# Patient Record
Sex: FEMALE | Race: WHITE | NOT HISPANIC OR LATINO | URBAN - METROPOLITAN AREA
[De-identification: names, ages, dates, MRNs, and addresses within clinical notes are randomized per-mention and may not be internally consistent; named-entity substitution may affect disease eponyms.]

---

## 2018-05-14 ENCOUNTER — OFFICE VISIT (OUTPATIENT)
Dept: DERMATOLOGY | Facility: CLINIC | Age: 28
End: 2018-05-14
Payer: COMMERCIAL

## 2018-05-14 DIAGNOSIS — L72.9 SCALP CYST: ICD-10-CM

## 2018-05-14 DIAGNOSIS — D48.5 NEOPLASM OF UNCERTAIN BEHAVIOR OF SKIN: ICD-10-CM

## 2018-05-14 DIAGNOSIS — Z12.83 SKIN CANCER SCREENING: Primary | ICD-10-CM

## 2018-05-14 DIAGNOSIS — D22.9 MULTIPLE PIGMENTED NEVI: ICD-10-CM

## 2018-05-14 ASSESSMENT — PAIN SCALES - GENERAL
PAINLEVEL: NO PAIN (0)
PAINLEVEL: NO PAIN (0)

## 2018-05-14 NOTE — MR AVS SNAPSHOT
After Visit Summary   5/14/2018    Valerie Mansfield    MRN: [id number]           Patient Information     Date Of Birth          1990        Visit Information        Provider Department      5/14/2018 1:30 PM Radha Marshall PA-C [facility] Dermatology        Today's Diagnoses     Skin cancer screening    -  1    Multiple pigmented nevi        Neoplasm of uncertain behavior of skin          Care Instructions    Wound Care After a Biopsy    What is a skin biopsy?  A skin biopsy allows the doctor to examine a very small piece of tissue under the microscope to determine the diagnosis and the best treatment for the skin condition. A local anesthetic (numbing medicine)  is injected with a very small needle into the skin area to be tested. A small piece of skin is taken from the area. Sometimes a suture (stitch) is used.     What are the risks of a skin biopsy?  I will experience scar, bleeding, swelling, pain, crusting and redness. I may experience incomplete removal or recurrence. Risks of this procedure are excessive bleeding, bruising, infection, nerve damage, numbness, thick (hypertrophic or keloidal) scar and non-diagnostic biopsy.    How should I care for my wound for the first 24 hours?    Keep the wound dry and covered for 24 hours    If it bleeds, hold direct pressure on the area for 15 minutes. If bleeding does not stop then go to the emergency room    Avoid strenuous exercise the first 1-2 days or as your doctor instructs you    How should I care for the wound after 24 hours?    After 24 hours, remove the bandage    You may bathe or shower as normal    If you had a scalp biopsy, you can shampoo as usual and can use shower water to clean the biopsy site daily    Clean the wound twice a day with gentle soap and water    Do not scrub, be gentle    Apply white petroleum/Vaseline after cleaning the wound with a cotton swab or a clean finger, and keep the site covered with a Bandaid /bandage.  Bandages are not necessary with a scalp biopsy    If you are unable to cover the site with a Bandaid /bandage, re-apply ointment 2-3 times a day to keep the site moist. Moisture will help with healing    Avoid strenuous activity for first 1-2 days    Avoid lakes, rivers, pools, and oceans until the stitches are removed or the site is healed    How do I clean my wound?    Wash hands thoroughly with soap or use hand  before all wound care    Clean the wound with gentle soap and water    Apply white petroleum/Vaseline  to wound after it is clean    Replace the Bandaid /bandage to keep the wound covered for the first few days or as instructed by your doctor    If you had a scalp biopsy, warm shower water to the area on a daily basis should suffice    What should I use to clean my wound?     Cotton-tipped applicators (Qtips )    White petroleum jelly (Vaseline ). Use a clean new container and use Q-tips to apply.    Bandaids   as needed    Gentle soap     How should I care for my wound long term?    Do not get your wound dirty    Keep up with wound care for one week or until the area is healed.    A small scab will form and fall off by itself when the area is completely healed. The area will be red and will become pink in color as it heals. Sun protection is very important for how your scar will turn out. Sunscreen with an SPF 30 or greater is recommended once the area is healed.    If you have stitches, stitches need to be removed in 14 days. You may return to our clinic for this or you may have it done locally at your doctor s office.    You should have some soreness but it should be mild and slowly go away over several days. Talk to your doctor about using tylenol for pain,    When should I call my doctor?  If you have increased:     Pain or swelling    Pus or drainage (clear or slightly yellow drainage is ok)    Temperature over 100F    Spreading redness or warmth around wound    When will I hear about my  results?  The biopsy results can take 2-3 weeks to come back. The clinic will call you with the results, send you a [employer] message, or have you schedule a follow-up clinic or phone time to discuss the results. Contact our clinics if you do not hear from us in 3 weeks.     Who should I call with questions?    [facility]: [phone]     [facility]: [phone]    For urgent needs outside of business hours call the [facility] at [phone] and ask for the dermatology resident on call              Follow-ups after your visit        Who to contact     Please call your clinic at [phone] to:    Ask questions about your health    Make or cancel appointments    Discuss your medicines    Learn about your test results    Speak to your doctor            Additional Information About Your Visit        [employer] Information     [employer] gives you secure access to your electronic health record. If you see a primary care provider, you can also send messages to your care team and make appointments. If you have questions, please call your primary care clinic.  If you do not have a primary care provider, please call [phone] and they will assist you.      [employer] is an electronic gateway that provides easy, online access to your medical records. With [employer], you can request a clinic appointment, read your test results, renew a prescription or communicate with your care team.     To access your existing account, please contact your [facility] or call [phone] for assistance.        Care EveryWhere ID     This is your Care EveryWhere ID. This could be used by other organizations to access your [place] medical records  [id number]         Blood Pressure from Last 3 Encounters:   10/19/15 110/71   01/13/14 112/77    Weight from Last 3 Encounters:   10/19/15 54.7 kg (120 lb 8 oz)   01/13/14 64 kg (141 lb 3.2 oz)               We Performed the Following     BIOPSY SKIN/SUBQ/MUC MEM, SINGLE LESION     Dermatological path order and indications        Primary Care Provider Office Phone # Fax #    Chris Greene [phone]       [facility] [address] [facility] [address]        Equal Access to Services     AMANDA HARTMANN : Hadii aad ku hadasho Soomaali, waaxda luqadaha, qaybta kaalmada adeegyada, waxay ayanin haykarenn juve corderoanniejosh quiroz . So [facility] [phone].    ATENCIÓN: Si habla español, tiene a marinelli disposición servicios gratuitos de asistencia lingüística. Rossana al [phone].    We comply with applicable federal civil rights laws and [place] laws. We do not discriminate on the basis of race, color, national origin, age, disability, sex, sexual orientation, or gender identity.            Thank you!     Thank you for choosing [facility] DERMATOLOGY  for your care. Our goal is always to provide you with excellent care. Hearing back from our patients is one way we can continue to improve our services. Please take a few minutes to complete the written survey that you may receive in the mail after your visit with us. Thank you!             Your Updated Medication List - Protect others around you: Learn how to safely use, store and throw away your medicines at www.disposemymeds.org.          This list is accurate as of 5/14/18  2:27 PM.  Always use your most recent med list.                   Brand Name Dispense Instructions for use Diagnosis    misoprostol 200 MCG tablet    CYTOTEC    2 tablet    Take 2 hours before procedure. Place 1 tab between cheek and gum on the right, and 2nd tab on the left. Dissolve for 30 min, then swallow.    General counseling and advice on female contraception

## 2018-05-14 NOTE — NURSING NOTE
Dermatology Rooming Note    Valerie Mansfield's goals for this visit include:   Chief Complaint   Patient presents with     Derm Problem     Valerie is here for a skin check, states she has a concerning mole on her back, state it's been painful the past 3 days.         Cornelia Leon LPN

## 2018-05-14 NOTE — LETTER
5/14/2018       RE: Valerie Mansfield  [address]  [address]  [place] [address]     Dear Colleague,    Thank you for referring your patient, Valerie Mansfield, to the [facility] DERMATOLOGY at [facility]. Please see a copy of my visit note below.    [facility] Dermatology Note      Dermatology Problem List:  1. New patient     Encounter Date: May 14, 2018    CC:  Chief Complaint   Patient presents with     Derm Problem     Valerie is here for a skin check, states she has a concerning mole on her back, state it's been painful the past 3 days.       History of Present Illness:  Ms. Valerie Mansfield is a 28 year old female who presents today for a skin check. This is the patient's first visit in our dermatology clinic. She reports concern over a mole on her back that has been painful for the past 3 days. She believes this mole has been present her whole life, but she is concerned about the this new symptom. She often does yoga and certain positions are made uncomfortable by the pain. She is not able to see it very well due to its location, but does not think that it is changing or growing. Additionally, she believes there is a cyst on her scalp. She used to have skin checks back in high school, but has not had one for a while. She's had an average amount of sun exposure in her lifetime, but is good about wearing sunscreen. Overall, she is generally feeling well and there are no other skin concerns at this time.     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 The patient denies use of tanning beds.    Family History:  Not reviewed at this visit.     Medications:  Current Outpatient  Prescriptions   Medication Sig Dispense Refill     misoprostol (CYTOTEC) 200 MCG tablet Take 2 hours before procedure. Place 1 tab between cheek and gum on the right, and 2nd tab on the left. Dissolve for 30 min, then swallow. (Patient not taking: Reported on 5/14/2018) 2 tablet 0     No Known Allergies    Review of Systems:  -Skin Establ Pt: The patient denies any new rash, pruritus, or lesions that are symptomatic, changing or bleeding, except as per HPI.  -Constitutional: The patient is feeling generally well.  Physical exam:  Vitals: There were no vitals taken for this visit.  GEN: This is a well developed, well-nourished female in no acute distress, in a pleasant mood.    SKIN: Total skin including the undergarment areas was performed. The exam included the head/face, neck, both arms, chest, back, abdomen, both legs, digits and/or nails.   - 1.2 x 1.0 cm brown patch with central 6 mm uniform papule on the left central lower back.   - Multiple regular brown pigmented macules and papules are identified on the trunk and extremities.  - 8 mm well circumscribed non tender nodule on the left vertex scalp    - No other lesions of concern on areas examined.     Impression/Plan:  1. Tender nevus, r/o dysplasia vs MM      Shave biopsy:  After discussion of benefits and risks including but not limited to bleeding/bruising, pain/swelling, infection, scar, incomplete removal, nerve damage/numbness, recurrence, and non-diagnostic biopsy, written consent, verbal consent and photographs were obtained. Time-out was performed. The area was cleaned with isopropyl alcohol.  was injected to obtain adequate anesthesia of the lesion on the left central lower back. 0.75 ml of 1% lidocaine  was injected to obtain adequate anesthesia. A  shave biopsy was performed. Hemostasis was achieved with aluminium chloride. Vaseline and a sterile dressing were applied. The patient tolerated the procedure and no complications were noted. The  patient was provided with verbal and written post care instructions.      2. Multiple clinically benign nevi on the trunk and extremities    ABCDs of melanoma were discussed and self skin checks were advised.     Recommend sunscreens SPF #30 or greater, protective clothing and avoidance of tanning beds.    Continue report of any new or changing nevi.     3. Cyst, likely pilar, Defers intervention at this time. Will monitor for clinical changes.   Follow-up annually as needed for new or changing lesions.     Staff Involved:  Staff Only    Scribe Disclosure:  I, Hipolito Rockwell, am serving as a scribe to document services personally performed by  Radha Marshall PA-C, based on data collection and the provider's statements to me.     Provider Disclosure:   The documentation recorded by the scribe accurately reflects the services I personally performed and the decisions made by me.    All risks, benefits and alternatives were discussed with patient.  Patient is in agreement and understands the assessment and plan.  All questions were answered.  Sun Screen Education was given.   Return to Clinic annually or sooner as needed.   Radha Marshall PA-C   [facility]

## 2018-05-14 NOTE — PROGRESS NOTES
[facility] Dermatology Note      Dermatology Problem List:  1. New patient     Encounter Date: May 14, 2018    CC:  Chief Complaint   Patient presents with     Derm Problem     Valerie is here for a skin check, states she has a concerning mole on her back, state it's been painful the past 3 days.           History of Present Illness:  Ms. Valreie Mansfield is a 28 year old female who presents today for a skin check. This is the patient's first visit in our dermatology clinic. She reports concern over a mole on her back that has been painful for the past 3 days. She believes this mole has been present her whole life, but she is concerned about the this new symptom. She often does yoga and certain positions are made uncomfortable by the pain. She is not able to see it very well due to its location, but does not think that it is changing or growing. Additionally, she believes there is a cyst on her scalp. She used to have skin checks back in high school, but has not had one for a while. She's had an average amount of sun exposure in her lifetime, but is good about wearing sunscreen. Overall, she is generally feeling well and there are no other skin concerns at this time.       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 The patient denies use of tanning beds.    Family History:  Not reviewed at this visit.     Medications:  Current Outpatient Prescriptions   Medication Sig Dispense Refill     misoprostol (CYTOTEC) 200 MCG tablet Take 2 hours before procedure. Place 1 tab between cheek and gum on the right, and 2nd tab on the left. Dissolve for 30 min, then swallow. (Patient not taking: Reported on 5/14/2018) 2 tablet 0        No Known Allergies    Review of Systems:  -Skin Establ Pt: The patient denies any new rash, pruritus, or lesions that are symptomatic, changing or bleeding, except as per HPI.  -Constitutional: The patient is feeling generally well.    Physical exam:  Vitals: There were no vitals taken for this visit.  GEN: This is a well developed, well-nourished female in no acute distress, in a pleasant mood.    SKIN: Total skin including the undergarment areas was performed. The exam included the head/face, neck, both arms, chest, back, abdomen, both legs, digits and/or nails.   - 1.2 x 1.0 cm brown patch with central 6 mm uniform papule on the left central lower back.   - Multiple regular brown pigmented macules and papules are identified on the trunk and extremities.  - 8 mm well circumscribed non tender nodule on the left vertex scalp    - No other lesions of concern on areas examined.       Impression/Plan:  1. Tender nevus, r/o dysplasia vs MM      Shave biopsy:  After discussion of benefits and risks including but not limited to bleeding/bruising, pain/swelling, infection, scar, incomplete removal, nerve damage/numbness, recurrence, and non-diagnostic biopsy, written consent, verbal consent and photographs were obtained. Time-out was performed. The area was cleaned with isopropyl alcohol.  was injected to obtain adequate anesthesia of the lesion on the left central lower back. 0.75 ml of 1% lidocaine  was injected to obtain adequate anesthesia. A  shave biopsy was performed. Hemostasis was achieved with aluminium chloride. Vaseline and a sterile dressing were applied. The patient tolerated the procedure and no complications were noted. The patient was provided with verbal and written post care instructions.      2. Multiple clinically benign nevi on the trunk and extremities    ABCDs of melanoma were discussed and self skin checks were advised.     Recommend sunscreens SPF #30 or greater, protective clothing and  avoidance of tanning beds.    Continue report of any new or changing nevi.     3. Cyst, likely pilar, Defers intervention at this time. Will monitor for clinical changes.   Follow-up annually as needed for new or changing lesions.       Staff Involved:  Staff Only    Scribe Disclosure:  I, Hipolito Rockwell, am serving as a scribe to document services personally performed by  Radha Marshall PA-C, based on data collection and the provider's statements to me.     Provider Disclosure:   The documentation recorded by the scribe accurately reflects the services I personally performed and the decisions made by me.    All risks, benefits and alternatives were discussed with patient.  Patient is in agreement and understands the assessment and plan.  All questions were answered.  Sun Screen Education was given.   Return to Clinic annually or sooner as needed.   Radha Marshall PA-C   [facility] Derm[facility]

## 2018-05-14 NOTE — PATIENT INSTRUCTIONS

## 2018-05-18 LAB — COPATH REPORT: NORMAL

## 2018-10-18 ENCOUNTER — RADIANT APPOINTMENT (OUTPATIENT)
Dept: ULTRASOUND IMAGING | Facility: CLINIC | Age: 28
End: 2018-10-18
Attending: ADVANCED PRACTICE MIDWIFE
Payer: COMMERCIAL

## 2018-10-18 ENCOUNTER — OFFICE VISIT (OUTPATIENT)
Dept: OBGYN | Facility: CLINIC | Age: 28
End: 2018-10-18
Attending: ADVANCED PRACTICE MIDWIFE
Payer: COMMERCIAL

## 2018-10-18 VITALS
SYSTOLIC BLOOD PRESSURE: 114 MMHG | BODY MASS INDEX: 21.99 KG/M2 | WEIGHT: 132 LBS | HEIGHT: 65 IN | HEART RATE: 92 BPM | DIASTOLIC BLOOD PRESSURE: 70 MMHG

## 2018-10-18 DIAGNOSIS — Z34.01 ENCOUNTER FOR SUPERVISION OF NORMAL FIRST PREGNANCY IN FIRST TRIMESTER: ICD-10-CM

## 2018-10-18 DIAGNOSIS — Z34.91 ENCOUNTER FOR SUPERVISION OF NORMAL PREGNANCY IN FIRST TRIMESTER, UNSPECIFIED GRAVIDITY: ICD-10-CM

## 2018-10-18 PROBLEM — Z34.00 SUPERVISION OF NORMAL FIRST PREGNANCY: Status: ACTIVE | Noted: 2018-10-18

## 2018-10-18 LAB
ABO + RH BLD: NORMAL
ABO + RH BLD: NORMAL
BASOPHILS # BLD AUTO: 0 10E9/L (ref 0–0.2)
BASOPHILS NFR BLD AUTO: 0.1 %
BLD GP AB SCN SERPL QL: NORMAL
BLOOD BANK CMNT PATIENT-IMP: NORMAL
DEPRECATED CALCIDIOL+CALCIFEROL SERPL-MC: 22 UG/L (ref 20–75)
DIFFERENTIAL METHOD BLD: NORMAL
EOSINOPHIL # BLD AUTO: 0.1 10E9/L (ref 0–0.7)
EOSINOPHIL NFR BLD AUTO: 1.9 %
ERYTHROCYTE [DISTWIDTH] IN BLOOD BY AUTOMATED COUNT: 13.2 % (ref 10–15)
HBV SURFACE AG SERPL QL IA: NONREACTIVE
HCT VFR BLD AUTO: 37.9 % (ref 35–47)
HGB BLD-MCNC: 12.9 G/DL (ref 11.7–15.7)
HIV 1+2 AB+HIV1 P24 AG SERPL QL IA: NONREACTIVE
IMM GRANULOCYTES # BLD: 0 10E9/L (ref 0–0.4)
IMM GRANULOCYTES NFR BLD: 0.3 %
LYMPHOCYTES # BLD AUTO: 1.3 10E9/L (ref 0.8–5.3)
LYMPHOCYTES NFR BLD AUTO: 17.3 %
MCH RBC QN AUTO: 30.1 PG (ref 26.5–33)
MCHC RBC AUTO-ENTMCNC: 34 G/DL (ref 31.5–36.5)
MCV RBC AUTO: 89 FL (ref 78–100)
MONOCYTES # BLD AUTO: 0.7 10E9/L (ref 0–1.3)
MONOCYTES NFR BLD AUTO: 9.8 %
NEUTROPHILS # BLD AUTO: 5.1 10E9/L (ref 1.6–8.3)
NEUTROPHILS NFR BLD AUTO: 70.6 %
NRBC # BLD AUTO: 0 10*3/UL
NRBC BLD AUTO-RTO: 0 /100
PLATELET # BLD AUTO: 173 10E9/L (ref 150–450)
RBC # BLD AUTO: 4.28 10E12/L (ref 3.8–5.2)
SPECIMEN EXP DATE BLD: NORMAL
WBC # BLD AUTO: 7.2 10E9/L (ref 4–11)

## 2018-10-18 PROCEDURE — 82306 VITAMIN D 25 HYDROXY: CPT | Performed by: MIDWIFE

## 2018-10-18 PROCEDURE — 85025 COMPLETE CBC W/AUTO DIFF WBC: CPT | Performed by: MIDWIFE

## 2018-10-18 PROCEDURE — 76817 TRANSVAGINAL US OBSTETRIC: CPT

## 2018-10-18 PROCEDURE — 87086 URINE CULTURE/COLONY COUNT: CPT | Performed by: MIDWIFE

## 2018-10-18 PROCEDURE — 86780 TREPONEMA PALLIDUM: CPT | Performed by: MIDWIFE

## 2018-10-18 PROCEDURE — 86901 BLOOD TYPING SEROLOGIC RH(D): CPT | Performed by: MIDWIFE

## 2018-10-18 PROCEDURE — 86900 BLOOD TYPING SEROLOGIC ABO: CPT | Performed by: MIDWIFE

## 2018-10-18 PROCEDURE — 36415 COLL VENOUS BLD VENIPUNCTURE: CPT | Performed by: MIDWIFE

## 2018-10-18 PROCEDURE — 86850 RBC ANTIBODY SCREEN: CPT | Performed by: MIDWIFE

## 2018-10-18 PROCEDURE — 86762 RUBELLA ANTIBODY: CPT | Performed by: MIDWIFE

## 2018-10-18 PROCEDURE — 87389 HIV-1 AG W/HIV-1&-2 AB AG IA: CPT | Performed by: MIDWIFE

## 2018-10-18 PROCEDURE — 87340 HEPATITIS B SURFACE AG IA: CPT | Performed by: MIDWIFE

## 2018-10-18 RX ORDER — GINGER ROOT 550 MG
550 CAPSULE ORAL DAILY
COMMUNITY
End: 2022-06-06

## 2018-10-18 ASSESSMENT — PAIN SCALES - GENERAL: PAINLEVEL: NO PAIN (0)

## 2018-10-18 NOTE — MR AVS SNAPSHOT
After Visit Summary   10/18/2018    Valerie Mansfield    MRN: [id number]           Patient Information     Date Of Birth          1990        Visit Information        Provider Department      10/18/2018 9:00 AM Chastity Carreon APRN CNM [facility]        Today's Diagnoses     Encounter for supervision of normal first pregnancy in first trimester          Care Instructions      Thank you for choosing [facility] ([facility] for your obstetrical care.  We are an integrated health clinic with obstetricians, midwives, a psychologist, an acupuncturist, massage, a nutritionist,a pharmacist, internal medicine and family practice all in one clinic.  If you have questions about services offered please ask.    Keep all appointments with your provider.  You will help catch, prevent and treat problems early.    Review your Expectant Family booklet given to you at this intake visit.  It can answer many basic questions including:    Treatment for nausea and vomiting p.22    Medications that are safe in pregnancy (see handout)    Healthy diet and weight gain p.7-8    Exercise and activity p.9    ASK questions!!  Please write down any questions or concerns for your next visit.    Eat a healthy diet.  Visit www.choosemyplate.gov and click on Pregnancy and Breastfeeding for information and tips    Do not smoke.  Avoid other people's smoke, too.  We are happy to help with referrals to stop smoking programs.    Do not drink alcohol.    Try to avoid people who have colds or other infections.  Practice good hand washing.    Consider registering for prenatal education classes through [employer] at  [facility].  You can view class schedules and register online at [email or web address] or call [phone] for questions    For urgent concerns call [facility] at [phone]  to talk with a triage nurse during regular clinic hours (8am-4:30pm).  This line is answered by our  "service 24 hours a day, after hours a provider will return your call.            Follow-ups after your visit        Follow-up notes from your care team     Return in about 3 weeks (around 11/8/2018) for New OB.      Your next 10 appointments already scheduled     Nov 08, 2018  3:45 PM CST   NEW OB with Katja Retana CNM   [facility] ([facility])    [facility]  [address]  [address]  [facility] [address] [phone]              Who to contact     Please call your clinic at [phone] to:    Ask questions about your health    Make or cancel appointments    Discuss your medicines    Learn about your test results    Speak to your doctor            Additional Information About Your Visit        XYDOharTreater Information     [employer] gives you secure access to your electronic health record. If you see a primary care provider, you can also send messages to your care team and make appointments. If you have questions, please call your primary care clinic.  If you do not have a primary care provider, please call [phone] and they will assist you.      [employer] is an electronic gateway that provides easy, online access to your medical records. With [employer], you can request a clinic appointment, read your test results, renew a prescription or communicate with your care team.     To access your existing account, please contact your [facility] or call [phone] for assistance.        Care EveryWhere ID     This is your Care EveryWhere ID. This could be used by other organizations to access your [facility] medical records  [id number]        Your Vitals Were     Pulse Height Last Period Breastfeeding? BMI (Body Mass Index)       92 1.651 m (5' 5\") 08/18/2018 No 21.97 kg/m2        Blood Pressure from Last 3 Encounters:   10/18/18 114/70   10/19/15 110/71   01/13/14 112/77    Weight from Last 3 Encounters: "   10/18/18 59.9 kg (132 lb)   10/19/15 54.7 kg (120 lb 8 oz)   01/13/14 64 kg (141 lb 3.2 oz)              We Performed the Following     25- OH-Vitamin D     ABO/Rh Type and Screen     CBC with Platelets Differential     Hepatitis B Surface Antigen     HIV Antigen Antibody Combo     Rubella Antibody IgG Quantitative     Treponema Abs w Reflex to RPR and Titer     Urine Culture Aerobic Bacterial          Today's Medication Changes          These changes are accurate as of 10/18/18  1:13 PM.  If you have any questions, ask your nurse or doctor.               Stop taking these medicines if you haven't already. Please contact your care team if you have questions.     misoprostol 200 MCG tablet   Commonly known as:  CYTOTEC   Stopped by:  Chastity Carreon APRN CNM                    Primary Care Provider    None Specified       No primary provider on file.        Equal Access to Services     [facility] : Marvin Fajardo, juno feliciano, ashley mirandaalaki greene, alvin quiroz . So [facility] [phone].    ATENCIÓN: Si habla español, tiene a marinelli disposición servicios gratuitos de asistencia lingüística. Anniaame al [phone].    We comply with applicable federal civil rights laws and [place] laws. We do not discriminate on the basis of race, color, national origin, age, disability, sex, sexual orientation, or gender identity.            Thank you!     Thank you for choosing [facility]  for your care. Our goal is always to provide you with excellent care. Hearing back from our patients is one way we can continue to improve our services. Please take a few minutes to complete the written survey that you may receive in the mail after your visit with us. Thank you!             Your Updated Medication List - Protect others around you: Learn how to safely use, store and throw away your medicines at www.disposemymeds.org.          This list is accurate as of  10/18/18  1:13 PM.  Always use your most recent med list.                   Brand Name Dispense Instructions for use Diagnosis    jose root 550 MG Caps capsule      Take 550 mg by mouth daily    Encounter for supervision of normal first pregnancy in first trimester       vitamin B complex with vitamin C Tabs tablet      Take 1 tablet by mouth daily    Encounter for supervision of normal first pregnancy in first trimester

## 2018-10-18 NOTE — NURSING NOTE
Chief Complaint   Patient presents with     Prenatal Care     Ob intake  8 weeks and 5 days   Sara Valadez LPN

## 2018-10-18 NOTE — PROGRESS NOTES
[facility] OB Intake note  Subjective   28 year old woman presents to clinic for initiation of OB care.  Patient's last menstrual period was 2018.    at 8w3d by Estimated Date of Delivery: May 27, 2019 based on LMP.  Reviewed dating ultrasound. Pregnancy is planned.      Symptoms since LMP include nausea, breast tenderness and fatigue.  Patient has tried these relief measures: diet modification, small frequent meals and increased rest.     - Genetic/Infection questionnaire completed, risks include none  Have you traveled during the pregnancy?No  Have your sexual partner(s) travelled during the pregnancy?No      - Current Medications B complex  And Parul    Current Outpatient Prescriptions   Medication Sig Dispense Refill     Parul, Zingiber officinalis, (PARUL ROOT) 550 MG CAPS capsule Take 550 mg by mouth daily       vitamin B complex with vitamin C (VITAMIN  B COMPLEX) TABS tablet Take 1 tablet by mouth daily           - Co-morbids none  Past Medical History:   Diagnosis Date     Menarche age 15     Migraines     resolved age 24 no meds      NO ACTIVE PROBLEMS      - Risk for GDM -  Personal history of GDM, BMI>30, h/o prediabetes/glucose intolerance, first degree relative with GDM or DM     - The patient does not h/o Pre Eclampsia, Current multi fetal gestation, Pre Gestational Diabetes (Type 1 or Type 2), chronic hypertension, renal disease, Autoimmune disease (systematic lupus erythematosus, antiphospholipid syndrome) so WILL NOT start low dose aspirin (81mg) starting between 12 and 28 weeks to prevent preeclampsia.    - The patient  does not have a history of spontaneous  birth so  WILL NOT consider progesterone starting at 16-20 weeks and/or serial transvaginal cervical length ultrasounds from 16-24 weeks.         PERSONAL/SOCIAL HISTORY    lives with their spouse.  Employment: Student.   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  Additional items:  None    Objective  -VS: reviewed and within normal limits   -General appearance: no acute distress, patient is comfortable   NEUROLOGICAL/PSYCHIATRIC   - Orientated x3,   -Mood and affect: : normal     Assessment/Plan  Valerie was seen today for prenatal care.    Diagnoses and all orders for this visit:    Encounter for supervision of normal first pregnancy in first trimester        28 year old  8w3d weeks of pregnancy with PHOEBE of May 27, 2019 by LMP of Patient's last menstrual period was 2018.. Ultrasound confirms.   Outpatient Encounter Prescriptions as of 10/18/2018   Medication Sig Dispense Refill     Nika, Zingiber officinalis, (NIKA ROOT) 550 MG CAPS capsule Take 550 mg by mouth daily       vitamin B complex with vitamin C (VITAMIN  B COMPLEX) TABS tablet Take 1 tablet by mouth daily          0     No facility-administered encounter medications on file as of 10/18/2018.     No orders of the defined types were placed in this encounter.                  No orders of the defined types were placed in this encounter.          - Oriented to Practice, types of care, and how to reach a provider.  Pt prefers CNM   - Patient received 1st trimester new OB education packet complete with aide of The Expectant Family booklet including information on genetic screening test options.  - Patient declines all genetic screening.  - Patient was encouraged to start prenatal vitamins as tolerated.    - Patient was sent to lab for routine OB labs including routine NOB labs .     -  Pregnancy concerns to be addressed by provider at new OB exam include: none.    Pt to RTO for NOB visit in 3 weeks and prn if questions or concerns    BHAVYA Todd CNM

## 2018-10-18 NOTE — LETTER
10/18/2018       RE: Valerie Mansfield  [address]  [address]  [place] [address]     Dear Colleague,    Thank you for referring your patient, Valerie Mansfield, to the [facility] at [facility]. Please see a copy of my visit note below.    [facility] OB Intake note  Subjective   28 year old woman presents to clinic for initiation of OB care.  Patient's last menstrual period was 2018.    at 8w3d by Estimated Date of Delivery: May 27, 2019 based on LMP.  Reviewed dating ultrasound. Pregnancy is planned.      Symptoms since LMP include nausea, breast tenderness and fatigue.  Patient has tried these relief measures: diet modification, small frequent meals and increased rest.     - Genetic/Infection questionnaire completed, risks include none  Have you traveled during the pregnancy?No  Have your sexual partner(s) travelled during the pregnancy?No      - Current Medications B complex  And Parul    Current Outpatient Prescriptions   Medication Sig Dispense Refill     Parul, Zingiber officinalis, (PARUL ROOT) 550 MG CAPS capsule Take 550 mg by mouth daily       vitamin B complex with vitamin C (VITAMIN  B COMPLEX) TABS tablet Take 1 tablet by mouth daily           - Co-morbids none  Past Medical History:   Diagnosis Date     Menarche age 15     Migraines     resolved age 24 no meds      NO ACTIVE PROBLEMS      - Risk for GDM -  Personal history of GDM, BMI>30, h/o prediabetes/glucose intolerance, first degree relative with GDM or DM     - The patient does not h/o Pre Eclampsia, Current multi fetal gestation, Pre Gestational Diabetes (Type 1 or Type 2), chronic hypertension, renal disease, Autoimmune disease (systematic lupus erythematosus, antiphospholipid syndrome) so WILL NOT start low dose aspirin (81mg) starting between 12 and 28 weeks to prevent preeclampsia.    - The patient  does not have a history of spontaneous  birth so  WILL NOT consider  progesterone starting at 16-20 weeks and/or serial transvaginal cervical length ultrasounds from 16-24 weeks.         PERSONAL/SOCIAL HISTORY    lives with their spouse.  Employment: Student.   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  Additional items: None    Objective  -VS: reviewed and within normal limits   -General appearance: no acute distress, patient is comfortable   NEUROLOGICAL/PSYCHIATRIC   - Orientated x3,   -Mood and affect: : normal     Assessment/Plan  Valerie was seen today for prenatal care.    Diagnoses and all orders for this visit:    Encounter for supervision of normal first pregnancy in first trimester        28 year old  8w3d weeks of pregnancy with PHOEBE of May 27, 2019 by LMP of Patient's last menstrual period was 2018.. Ultrasound confirms.   Outpatient Encounter Prescriptions as of 10/18/2018   Medication Sig Dispense Refill     Nika, Zingiber officinalis, (NIKA ROOT) 550 MG CAPS capsule Take 550 mg by mouth daily       vitamin B complex with vitamin C (VITAMIN  B COMPLEX) TABS tablet Take 1 tablet by mouth daily          0     No facility-administered encounter medications on file as of 10/18/2018.     No orders of the defined types were placed in this encounter.                  No orders of the defined types were placed in this encounter.          - Oriented to Practice, types of care, and how to reach a provider.  Pt prefers CNM   - Patient received 1st trimester new OB education packet complete with aide of The Expectant Family booklet including information on genetic screening test options.  - Patient declines all genetic screening.  - Patient was encouraged to start prenatal vitamins as tolerated.    - Patient was sent to lab for routine OB labs including routine NOB labs .     -  Pregnancy concerns to be addressed by provider at new OB exam include: none.    Pt to RTO for NOB visit in 3 weeks and prn if questions or concerns    Chastity  Velma, APRN CNM

## 2018-10-18 NOTE — PATIENT INSTRUCTIONS
Thank you for choosing [facility] for your obstetrical care.  We are an integrated health clinic with obstetricians, midwives, a psychologist, an acupuncturist, massage, a nutritionist,a pharmacist, internal medicine and family practice all in one clinic.  If you have questions about services offered please ask.    Keep all appointments with your provider.  You will help catch, prevent and treat problems early.    Review your Expectant Family booklet given to you at this intake visit.  It can answer many basic questions including:    Treatment for nausea and vomiting p.22    Medications that are safe in pregnancy (see handout)    Healthy diet and weight gain p.7-8    Exercise and activity p.9    ASK questions!!  Please write down any questions or concerns for your next visit.    Eat a healthy diet.  Visit www.choosemyplate.gov and click on Pregnancy and Breastfeeding for information and tips    Do not smoke.  Avoid other people's smoke, too.  We are happy to help with referrals to stop smoking programs.    Do not drink alcohol.    Try to avoid people who have colds or other infections.  Practice good hand washing.    Consider registering for prenatal education classes through [employer] at  [facility].  You can view class schedules and register online at [email or web address] or call [phone] for questions    For urgent concerns call [facility] at [phone]  to talk with a triage nurse during regular clinic hours (8am-4:30pm).  This line is answered by our service 24 hours a day, after hours a provider will return your call.

## 2018-10-19 DIAGNOSIS — Z34.01 ENCOUNTER FOR SUPERVISION OF NORMAL FIRST PREGNANCY IN FIRST TRIMESTER: ICD-10-CM

## 2018-10-19 DIAGNOSIS — E55.9 VITAMIN D DEFICIENCY: Primary | ICD-10-CM

## 2018-10-19 LAB
BACTERIA SPEC CULT: NO GROWTH
Lab: NORMAL
RUBV IGG SERPL IA-ACNC: 21 IU/ML
SPECIMEN SOURCE: NORMAL
T PALLIDUM AB SER QL: NONREACTIVE

## 2018-11-08 ENCOUNTER — OFFICE VISIT (OUTPATIENT)
Dept: OBGYN | Facility: CLINIC | Age: 28
End: 2018-11-08
Attending: ADVANCED PRACTICE MIDWIFE
Payer: COMMERCIAL

## 2018-11-08 VITALS
WEIGHT: 134.6 LBS | SYSTOLIC BLOOD PRESSURE: 111 MMHG | DIASTOLIC BLOOD PRESSURE: 72 MMHG | BODY MASS INDEX: 22.42 KG/M2 | HEIGHT: 65 IN | HEART RATE: 89 BPM

## 2018-11-08 DIAGNOSIS — Z34.01 ENCOUNTER FOR SUPERVISION OF NORMAL FIRST PREGNANCY IN FIRST TRIMESTER: Primary | ICD-10-CM

## 2018-11-08 PROCEDURE — 87591 N.GONORRHOEAE DNA AMP PROB: CPT | Performed by: ADVANCED PRACTICE MIDWIFE

## 2018-11-08 PROCEDURE — 87491 CHLMYD TRACH DNA AMP PROBE: CPT | Performed by: ADVANCED PRACTICE MIDWIFE

## 2018-11-08 PROCEDURE — G0463 HOSPITAL OUTPT CLINIC VISIT: HCPCS | Mod: ZF

## 2018-11-08 NOTE — LETTER
2018       RE: Valerie Mansfield  [address]  [address]  [place] [address]     Dear Colleague,    Thank you for referring your patient, Valerie Mansfield, to the [facility] at [facility]. Please see a copy of my visit note below.    SUBJECTIVE:   Valerie is a 28 year old female who presents to clinic for a new OB visit.   at 11w3d with Estimated Date of Delivery: May 27, 2019 based on US confirms. Feels well. Has  started PNV.     She has not had bleeding since her LMP.   She has not had nausea. Weight loss has not occurred.   This was a planned pregnancy.   FOB is involved,   Ramo     OTHER CONCERNS:     ===========================================   ROS: 10 point ROS neg other than the symptoms noted above in the HPI.      PSYCHIATRIC:  Denies mood changes  PHQ-9 score:    PHQ-9 SCORE 10/19/2015   Total Score 1     No flowsheet data found.      Past History:  Her past medical history   Past Medical History:   Diagnosis Date     Menarche age 15     Migraines     resolved age 24 no meds      NO ACTIVE PROBLEMS    .   This is her first pregnancy  Since her last LMP she denies use of alcohol, tobacco and street drugs.  HISTORY:  Family History   Problem Relation Age of Onset     Skin Cancer Paternal Grandfather      Social History     Social History     Marital status: Single     Spouse name: Ramo     Number of children: 1     Years of education: 18     Occupational History           grad student [employer]      Social History Main Topics     Smoking status: Never Smoker     Smokeless tobacco: Never Used     Alcohol use 0.0 oz/week     0 Standard drinks or equivalent per week      Comment: Ocassional     Drug use: No     Sexual activity: Yes     Partners: Male     Birth control/ protection: None     Other Topics Concern     Not on file     Social History Narrative    Exercise Bikes daily yoga walking      Current Outpatient Prescriptions  "  Medication Sig     Prenatal Vit-Fe Fumarate-FA (PRENATAL ONE DAILY PO)      cholecalciferol (VITAMIN D3) 5000 units CAPS capsule Take 1 capsule (5,000 Units) by mouth daily Take one capsule daily. (Patient not taking: Reported on 2018)     Parul, Zingiber officinalis, (PARUL ROOT) 550 MG CAPS capsule Take 550 mg by mouth daily     vitamin B complex with vitamin C (VITAMIN  B COMPLEX) TABS tablet Take 1 tablet by mouth daily     No current facility-administered medications for this visit.      Allergies   Allergen Reactions     Cats      Dogs        ============================================  MEDICAL HISTORY  Past Medical History:   Diagnosis Date     Menarche age 15     Migraines     resolved age 24 no meds      NO ACTIVE PROBLEMS      Past Surgical History:   Procedure Laterality Date     ENT SURGERY      Byers teeth removal        Obstetric History       T0      L0     SAB0   TAB0   Ectopic0   Multiple0   Live Births0       # Outcome Date GA Lbr Mamadou/2nd Weight Sex Delivery Anes PTL Lv   2 Current            1 AB                     GYN History- no Abnormal Pap Smears  PT declines PAP until PP                         Cervical procedures: no                        History of STI: no     I personally reviewed the past social/family/medical and surgical history on the date of service.   I reviewed lab work done at Intake visit with patient.    EXAM:  /72 (BP Location: Right arm, Patient Position: Chair)  Pulse 89  Ht 1.651 m (5' 5\")  Wt 61.1 kg (134 lb 9.6 oz)  LMP 2018  BMI 22.4 kg/m2   EXAM:  GENERAL:  Pleasant pregnant female, alert, cooperative  and well groomed.  SKIN:  Warm and dry, without lesions or rashes  HEAD: Symmetrical features.  NECK:  Thyroid without enlargement and nodules.  Lymph nodes not palpable.   LUNGS:  Clear to auscultation.  BREAST:    No dominant, fixed or suspicious masses are noted.  No skin or nipple changes or axillary nodes.   Nipples " everted.      HEART:  RRR without murmur.  ABDOMEN: Soft without masses , tenderness or organomegaly.  No CVA tenderness.  Uterus palpable at size equal to dates.  No scars noted.. Fetal heart tones present.  MUSCULOSKELETAL:  Full range of motion  EXTREMITIES:  No edema. No significant varicosities.   PELVIC EXAM:  GENITALIA: EGBUS  External genitalia, Bartholin's glands, urethra & Hetland's glands:normal. Vulva reveals no erythema or lesions.         VAGINA:  pink, normal rugae and discharge, no lesions, good tone.   CERVIX:  smooth, without discharge or CMT.                UTERUS: Anteverted,  nontender 11 week size.   ADNEXA:  Without masses or tenderness.   RECTAL:  Normal appearance.  Digital exam deferred.  WET PREP:Not done  GC/CHLAMYDIA CULTURE OBTAINED:YES    Lab Results   Component Value Date    PAP NIL 10/19/2015          ASSESSMENT:  28 year old , 11w3d weeks of pregnancy with PHOEBE of May 27, 2019 by US confirms  Intrauterine pregnancy 11w3d size  consistent with dates  Genetic Screening: Level 2 Ultrasound only    ICD-10-CM    1. Normal first pregnancy in first trimester Z34.01 Neisseria gonorrhoeae PCR     Chlamydia trachomatis PCR (Clinic Collect)       PLAN:  - Reviewed use of triage nurse line and contacting the on-call provider after hours for an urgent need such as fever, vagina bleeding, bladder or vaginal infection, rupture of membranes,  or term labor.    - Reviewed best evidence for: weight gain for her weight and height for pregnancy:  Based on pre-pregnancy weight of  and Body mass index is 22.4 kg/(m^2). RECOMMENDED WEIGHT GAIN: 25-35 lbs.  -If BMI>=30, weight gain/exercise handout given and reviewed. BMI entered on problem list with plan for possible referrals and  testing  -If BMI >=30, and has one of other risk for sleep apnea (snoring, observed apnea or hypertension) refer for sleep study.  - Reviewed healthy diet and foods to avoid; exercise and activity during  pregnancy; avoiding exposure to toxoplasmosis; and maintenance of a generally healthy lifestyle.   - Discussed the harms, benefits, side effects and alternative therapies for current prescribed and OTC medications.  - All pt's  questions discussed and answered.  Pt verbalized understanding of and agreement to plan of care.     - Continue scheduled prenatal care and prn if questions or concerns    BHAVYA Todd CNM

## 2018-11-08 NOTE — PROGRESS NOTES
SUBJECTIVE:   Valerie is a 28 year old female who presents to clinic for a new OB visit.   at 11w3d with Estimated Date of Delivery: May 27, 2019 based on US confirms. Feels well. Has  started PNV.     She has not had bleeding since her LMP.   She has not had nausea. Weight loss has not occurred.   This was a planned pregnancy.   FOB is involved,   Ramo     OTHER CONCERNS:     ===========================================   ROS: 10 point ROS neg other than the symptoms noted above in the HPI.      PSYCHIATRIC:  Denies mood changes  PHQ-9 score:    PHQ-9 SCORE 10/19/2015   Total Score 1     No flowsheet data found.      Past History:  Her past medical history   Past Medical History:   Diagnosis Date     Menarche age 15     Migraines     resolved age 24 no meds      NO ACTIVE PROBLEMS    .   This is her first pregnancy  Since her last LMP she denies use of alcohol, tobacco and street drugs.  HISTORY:  Family History   Problem Relation Age of Onset     Skin Cancer Paternal Grandfather      Social History     Social History     Marital status: Single     Spouse name: Ramo     Number of children: 1     Years of education: 18     Occupational History           grad student [employer]      Social History Main Topics     Smoking status: Never Smoker     Smokeless tobacco: Never Used     Alcohol use 0.0 oz/week     0 Standard drinks or equivalent per week      Comment: Ocassional     Drug use: No     Sexual activity: Yes     Partners: Male     Birth control/ protection: None     Other Topics Concern     Not on file     Social History Narrative    Exercise Bikes daily yoga walking      Current Outpatient Prescriptions   Medication Sig     Prenatal Vit-Fe Fumarate-FA (PRENATAL ONE DAILY PO)      cholecalciferol (VITAMIN D3) 5000 units CAPS capsule Take 1 capsule (5,000 Units) by mouth daily Take one capsule daily. (Patient not taking: Reported on 2018)     Nika, Zingiber officinalis, (NIKA ROOT) 550 MG CAPS  "capsule Take 550 mg by mouth daily     vitamin B complex with vitamin C (VITAMIN  B COMPLEX) TABS tablet Take 1 tablet by mouth daily     No current facility-administered medications for this visit.      Allergies   Allergen Reactions     Cats      Dogs        ============================================  MEDICAL HISTORY  Past Medical History:   Diagnosis Date     Menarche age 15     Migraines     resolved age 24 no meds      NO ACTIVE PROBLEMS      Past Surgical History:   Procedure Laterality Date     ENT SURGERY      [place] teeth removal        Obstetric History       T0      L0     SAB0   TAB0   Ectopic0   Multiple0   Live Births0       # Outcome Date GA Lbr Mamadou/2nd Weight Sex Delivery Anes PTL Lv   2 Current            1 AB                     GYN History- no Abnormal Pap Smears  PT declines PAP until PP                         Cervical procedures: no                        History of STI: no     I personally reviewed the past social/family/medical and surgical history on the date of service.   I reviewed lab work done at Intake visit with patient.    EXAM:  /72 (BP Location: Right arm, Patient Position: Chair)  Pulse 89  Ht 1.651 m (5' 5\")  Wt 61.1 kg (134 lb 9.6 oz)  LMP 2018  BMI 22.4 kg/m2   EXAM:  GENERAL:  Pleasant pregnant female, alert, cooperative  and well groomed.  SKIN:  Warm and dry, without lesions or rashes  HEAD: Symmetrical features.  NECK:  Thyroid without enlargement and nodules.  Lymph nodes not palpable.   LUNGS:  Clear to auscultation.  BREAST:    No dominant, fixed or suspicious masses are noted.  No skin or nipple changes or axillary nodes.   Nipples everted.      HEART:  RRR without murmur.  ABDOMEN: Soft without masses , tenderness or organomegaly.  No CVA tenderness.  Uterus palpable at size equal to dates.  No scars noted.. Fetal heart tones present.  MUSCULOSKELETAL:  Full range of motion  EXTREMITIES:  No edema. No significant varicosities. "   PELVIC EXAM:  GENITALIA: EGBUS  External genitalia, Bartholin's glands, urethra & Farragut's glands:normal. Vulva reveals no erythema or lesions.         VAGINA:  pink, normal rugae and discharge, no lesions, good tone.   CERVIX:  smooth, without discharge or CMT.                UTERUS: Anteverted,  nontender 11 week size.   ADNEXA:  Without masses or tenderness.   RECTAL:  Normal appearance.  Digital exam deferred.  WET PREP:Not done  GC/CHLAMYDIA CULTURE OBTAINED:YES    Lab Results   Component Value Date    PAP NIL 10/19/2015          ASSESSMENT:  28 year old , 11w3d weeks of pregnancy with PHOEBE of May 27, 2019 by US confirms  Intrauterine pregnancy 11w3d size  consistent with dates  Genetic Screening: Level 2 Ultrasound only    ICD-10-CM    1. Normal first pregnancy in first trimester Z34.01 Neisseria gonorrhoeae PCR     Chlamydia trachomatis PCR (Clinic Collect)       PLAN:  - Reviewed use of triage nurse line and contacting the on-call provider after hours for an urgent need such as fever, vagina bleeding, bladder or vaginal infection, rupture of membranes,  or term labor.    - Reviewed best evidence for: weight gain for her weight and height for pregnancy:  Based on pre-pregnancy weight of  and Body mass index is 22.4 kg/(m^2). RECOMMENDED WEIGHT GAIN: 25-35 lbs.  -If BMI>=30, weight gain/exercise handout given and reviewed. BMI entered on problem list with plan for possible referrals and  testing  -If BMI >=30, and has one of other risk for sleep apnea (snoring, observed apnea or hypertension) refer for sleep study.  - Reviewed healthy diet and foods to avoid; exercise and activity during pregnancy; avoiding exposure to toxoplasmosis; and maintenance of a generally healthy lifestyle.   - Discussed the harms, benefits, side effects and alternative therapies for current prescribed and OTC medications.  - All pt's  questions discussed and answered.  Pt verbalized understanding of and  agreement to plan of care.     - Continue scheduled prenatal care and prn if questions or concerns    BHAVYA Todd CNM

## 2018-11-08 NOTE — MR AVS SNAPSHOT
After Visit Summary   11/8/2018    Valerie Mansfield    MRN: [id number]           Patient Information     Date Of Birth          1990        Visit Information        Provider Department      11/8/2018 3:45 PM Katja Retana CNM [facility]        Today's Diagnoses     Encounter for supervision of normal first pregnancy in first trimester    -  1       Follow-ups after your visit        Additional Services     MAT FETAL MED CTR REFERRAL-PREGNANCY       Body mass index is 22.4 kg/(m^2).    >> Patient may proceed with recommendations for further testing as directed by the Maternal Fetal Medicine Specialist >>    >> If requesting Fetal Echo: MFM will determine appropriate location for exam due to indication.    >> If requesting Lung Maturity Amnio:  If results indicate fetal lung maturity, induction or C/S is recommended within 36 hours.  Please schedule accordingly.     Dear Patient:   Please be aware that coverage of these services is subject to the terms and limitations of your health insurance plan.  Call member services at your health plan with any benefit or coverage questions.      Please bring the following to your appointment:    >>  Any x-rays, CTs or MRIs which have been performed.  Contact the facility where they were done to arrange for  prior to your scheduled appointment.  Any new CT, MRI or other procedures ordered by your specialist must be performed at a [place] facility or coordinated by your clinic's referral office.  >>  List of current medications   >>  This referral request   >>  Any documents/labs given to you for this referral                  Follow-up notes from your care team     Return in about 5 weeks (around 12/13/2018) for IVETTE.      Your next 10 appointments already scheduled     Dec 13, 2018  3:45 PM CST   RETURN OB with BHAVYA Pacheco CNM   [facility] ([facility])    [place] [facility]  "[address]ldg [facility] [address]  [address]  [address]  [facility] [id number] [phone]              Who to contact     Please call your clinic at [phone] to:    Ask questions about your health    Make or cancel appointments    Discuss your medicines    Learn about your test results    Speak to your doctor            Additional Information About Your Visit        [employer] Information     BookNow gives you secure access to your electronic health record. If you see a primary care provider, you can also send messages to your care team and make appointments. If you have questions, please call your primary care clinic.  If you do not have a primary care provider, please call [phone] and they will assist you.      BookNow is an electronic gateway that provides easy, online access to your medical records. With BookNow, you can request a clinic appointment, read your test results, renew a prescription or communicate with your care team.     To access your existing account, please contact your [facility] or call [phone] for assistance.        Care EveryWhere ID     This is your Care EveryWhere ID. This could be used by other organizations to access your [place] medical records  [id number]        Your Vitals Were     Pulse Height Last Period BMI (Body Mass Index)          89 1.651 m (5' 5\") 08/18/2018 22.4 kg/m2         Blood Pressure from Last 3 Encounters:   11/08/18 111/72   10/18/18 114/70   10/19/15 110/71    Weight from Last 3 Encounters:   11/08/18 61.1 kg (134 lb 9.6 oz)   10/18/18 59.9 kg (132 lb)   10/19/15 54.7 kg (120 lb 8 oz)              We Performed the Following     Chlamydia trachomatis PCR (Clinic Collect)     MAT FETAL MED CTR REFERRAL-PREGNANCY     Neisseria gonorrhoeae PCR        Primary Care Provider    None Specified       No primary provider on file.        Equal Access to Services     AMANDA DOCKERY: juno Mckenzie qaybta " alvin montoyateri quiroz ah. So [facility] [phone].    ATENCIÓN: Si gucci robles, tiene a marinelli disposición servicios gratuitos de asistencia lingüística. Rossana al [phone].    We comply with applicable federal civil rights laws and [place] laws. We do not discriminate on the basis of race, color, national origin, age, disability, sex, sexual orientation, or gender identity.            Thank you!     Thank you for choosing [facility]  for your care. Our goal is always to provide you with excellent care. Hearing back from our patients is one way we can continue to improve our services. Please take a few minutes to complete the written survey that you may receive in the mail after your visit with us. Thank you!             Your Updated Medication List - Protect others around you: Learn how to safely use, store and throw away your medicines at www.disposemymeds.org.          This list is accurate as of 11/8/18  5:03 PM.  Always use your most recent med list.                   Brand Name Dispense Instructions for use Diagnosis    cholecalciferol 5000 units Caps capsule    vitamin D3    90 capsule    Take 1 capsule (5,000 Units) by mouth daily Take one capsule daily.    Vitamin D deficiency, Encounter for supervision of normal first pregnancy in first trimester       jose root 550 MG Caps capsule      Take 550 mg by mouth daily    Encounter for supervision of normal first pregnancy in first trimester       PRENATAL ONE DAILY PO       Encounter for supervision of normal first pregnancy in first trimester       vitamin B complex with vitamin C Tabs tablet      Take 1 tablet by mouth daily    Encounter for supervision of normal first pregnancy in first trimester

## 2018-11-09 LAB
C TRACH DNA SPEC QL NAA+PROBE: NEGATIVE
N GONORRHOEA DNA SPEC QL NAA+PROBE: NEGATIVE
SPECIMEN SOURCE: NORMAL
SPECIMEN SOURCE: NORMAL

## 2018-11-14 ENCOUNTER — OFFICE VISIT (OUTPATIENT)
Dept: DERMATOLOGY | Facility: CLINIC | Age: 28
End: 2018-11-14
Payer: COMMERCIAL

## 2018-11-14 DIAGNOSIS — L72.3 INFLAMED SEBACEOUS CYST: Primary | ICD-10-CM

## 2018-11-14 ASSESSMENT — PAIN SCALES - GENERAL: PAINLEVEL: NO PAIN (0)

## 2018-11-14 NOTE — MR AVS SNAPSHOT
After Visit Summary   11/14/2018    Valerie Mansfield    MRN: [id number]           Patient Information     Date Of Birth          1990        Visit Information        Provider Department      11/14/2018 3:15 PM Radha Marshall PA-C [facility]        Today's Diagnoses     Inflamed sebaceous cyst    -  1       Follow-ups after your visit        Your next 10 appointments already scheduled     Dec 13, 2018  3:45 PM CST   RETURN OB with BHAVYA Pacheco CNM   [facility] ([facility])    [place] [facility] [address]  [address]  [address]  [facility] [address]   [phone]            Jan 02, 2019  9:30 AM ALLISON TOLEDO US COMP with [id number]   [facility])    [address]  [facility] [id number] [phone]           Wear comfortable clothes and leave your valuables at home.            Jan 02, 2019 10:00 AM CST   Radiology MD with UR TRE FARIAS   [facility])    [address]  [facility] [address]   [phone]           Please arrive at the time given for your first appointment. This visit is used internally to schedule the physician's time during your ultrasound.              Who to contact     Please call your clinic at [phone] to:    Ask questions about your health    Make or cancel appointments    Discuss your medicines    Learn about your test results    Speak to your doctor            Additional Information About Your Visit        MyChart Information     Ed4U gives you secure access to your electronic health record. If you see a primary care provider, you can also send messages to your care team and make appointments. If you have questions, please call your primary care clinic.  If you do not have a primary care provider, please call  [phone] and they will assist you.      [employer] Act is an electronic gateway that provides easy, online access to your medical records. With [employer], you can request a clinic appointment, read your test results, renew a prescription or communicate with your care team.     To access your existing account, please contact your [facility] or call [phone] for assistance.        Care EveryWhere ID     This is your Care EveryWhere ID. This could be used by other organizations to access your [place] medical records  [id number]        Your Vitals Were     Last Period                   08/18/2018            Blood Pressure from Last 3 Encounters:   11/08/18 111/72   10/18/18 114/70   10/19/15 110/71    Weight from Last 3 Encounters:   11/08/18 61.1 kg (134 lb 9.6 oz)   10/18/18 59.9 kg (132 lb)   10/19/15 54.7 kg (120 lb 8 oz)              We Performed the Following     DRAIN SKIN ABSCESS SIMPLE/SINGLE        Primary Care Provider    None Specified       No primary provider on file.        Equal Access to Services     [facility] HospitalJAY[facility]ON : Hadii maricruz sears hadasho Soomaali, waaxda luqadaha, qaybta kaalmada adeegyamatt, alvin quiroz . So [facility] [phone].    ATENCIÓN: Si habla español, tiene a marinelli disposición servicios gratuitos de asistencia lingüística. Llame al [phone].    We comply with applicable federal civil rights laws and [place] laws. We do not discriminate on the basis of race, color, national origin, age, disability, sex, sexual orientation, or gender identity.            Thank you!     Thank you for choosing [facility]  for your care. Our goal is always to provide you with excellent care. Hearing back from our patients is one way we can continue to improve our services. Please take a few minutes to complete the written survey that you may receive in the mail after your visit with us. Thank you!             Your Updated Medication List - Protect  others around you: Learn how to safely use, store and throw away your medicines at www.disposemymeds.org.          This list is accurate as of 11/14/18  8:24 PM.  Always use your most recent med list.                   Brand Name Dispense Instructions for use Diagnosis    cholecalciferol 5000 units (125 mcg) Caps capsule    vitamin D3    90 capsule    Take 1 capsule (5,000 Units) by mouth daily Take one capsule daily.    Vitamin D deficiency, Encounter for supervision of normal first pregnancy in first trimester       jose root 550 MG Caps capsule      Take 550 mg by mouth daily    Encounter for supervision of normal first pregnancy in first trimester       PRENATAL ONE DAILY PO       Encounter for supervision of normal first pregnancy in first trimester       vitamin B complex with vitamin C Tabs tablet      Take 1 tablet by mouth daily    Encounter for supervision of normal first pregnancy in first trimester

## 2018-11-14 NOTE — LETTER
11/14/2018       RE: Valerie Mansfield  [address]  [address]  [facility] [address]     Dear Colleague,    Thank you for referring your patient, Valerie Mansfield, to the [facility] at [facility]. Please see a copy of my visit note below.    [facility] Dermatology Note      Dermatology Problem List:  1. Skin cancer screening, 05/14/18   2. Cyst, left vertex scalp  - s/p I&D 11/14/18     Encounter Date: Nov 14, 2018    CC:  Chief Complaint   Patient presents with     Derm Problem     Cyst, Valerie notes she has a lesion on her scalp that is painful.      History of Present Illness:  Ms. Valerie Mansfield is a 28 year old female who presents today for a skin check. The patient was last seen in the dermatology clinic on 05/14/18 during which a NUB on her left central lower back was biopsied. This lesion returned pathology consistent with an intradermal melanocytic nevus.     Today she reports that her cyst has been flaring lately. A few months ago she noticed her cyst swelled up and then flattened out again. And then about 2 days ago she noticed that the cyst on her scalp started getting inflamed again. However, it is not as swollen today as it was yesterday. She has found it is painful, as it rubs on her bike helmet.        Otherwise the patient reports no additional painful, bleeding, nonhealing or pruritic lesions and denies any new or changing moles.    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 The patient denies use of tanning beds.    Family History:  Not reviewed at this visit.      Medications:  Current Outpatient Prescriptions   Medication Sig Dispense Refill     cholecalciferol (VITAMIN D3) 5000 units CAPS capsule Take 1 capsule (5,000 Units) by mouth daily Take one capsule daily. (Patient not taking: Reported on 11/8/2018) 90 capsule 3     Parul, Zingiber officinalis, (PARUL ROOT) 550 MG CAPS capsule Take 550 mg by mouth daily       Prenatal Vit-Fe Fumarate-FA (PRENATAL ONE DAILY PO)        vitamin B complex with vitamin C (VITAMIN  B COMPLEX) TABS tablet Take 1 tablet by mouth daily         Allergies   Allergen Reactions     Cats      Dogs        Review of Systems:  -Skin Establ Pt: The patient denies any new rash, pruritus, or lesions that are symptomatic, changing or bleeding, except as per HPI.  -Constitutional: The patient is feeling generally well.    Physical exam:  Vitals: LMP 08/18/2018  GEN: This is a well developed, well-nourished female in no acute distress, in a pleasant mood.    SKIN: A focused examination of the scalp was performed. Significant for:   - 2 cm x 2 cm well circumscribed fluctuant minimally tender nodule on the left vertex scalp    - No other lesions of concern on areas examined.       Impression/Plan:  1. Cyst, inflamed on left vertex scalp  - Incision & Drainage: After discussion of benefits and risks including but not limited to bleeding/bruising, pain/swelling, infection, scar, incomplete removal, nerve damage/numbness, recurrence,  written consent and verbal consent was obtained. The area was cleaned with isopropyl alcohol. 0.5ml of 1% lidocaine with epinephrine was injected to obtain adequate anesthesia of the lesion on the left vertex scalp. The lesion was then lanced with a blade and drained of yellowish sebum. The patient tolerated the procedure and no complications were noted. The patient was provided with verbal and written post care instructions.  -Pt is currently pregnant and would like to avoid excision of the cyst until after pregnancy. She  will notify the office if she would like a dermatology surgery referral.     Staff Involved:  Scribe/Staff    Scribe Disclosure:   I, Amira Olmstead, am serving as a scribe to document services personally performed by Radha Marshall PA-C, based on data collection and the provider's statements to me.    Provider Disclosure:   The documentation recorded by the scribe accurately reflects the services I personally performed and the decisions made by me.    All risks, benefits and alternatives were discussed with patient.  Patient is in agreement and understands the assessment and plan.  All questions were answered.  Sun Screen Education was given.   Return to Clinic as needed.   Radha Marshall PA-C   [facility]

## 2018-11-14 NOTE — PROGRESS NOTES
[facility] Dermatology Note      Dermatology Problem List:  1. Skin cancer screening, 05/14/18   2. Cyst, left vertex scalp  - s/p I&D 11/14/18     Encounter Date: Nov 14, 2018    CC:  Chief Complaint   Patient presents with     Derm Problem     Cyst, Valerie notes she has a lesion on her scalp that is painful.      History of Present Illness:  Ms. Valerie Mansfield is a 28 year old female who presents today for a skin check. The patient was last seen in the dermatology clinic on 05/14/18 during which a NUB on her left central lower back was biopsied. This lesion returned pathology consistent with an intradermal melanocytic nevus.     Today she reports that her cyst has been flaring lately. A few months ago she noticed her cyst swelled up and then flattened out again. And then about 2 days ago she noticed that the cyst on her scalp started getting inflamed again. However, it is not as swollen today as it was yesterday. She has found it is painful, as it rubs on her bike helmet.        Otherwise the patient reports no additional painful, bleeding, nonhealing or pruritic lesions and denies any new or changing moles.    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 The patient denies use of tanning beds.    Family History:  Not reviewed at this visit.     Medications:  Current Outpatient Prescriptions   Medication Sig Dispense Refill     cholecalciferol (VITAMIN D3) 5000 units CAPS capsule Take 1 capsule (5,000 Units) by mouth daily Take one capsule daily. (Patient not taking: Reported on 11/8/2018) 90 capsule 3     Parul, Zingiber  officinalis, (NIKA ROOT) 550 MG CAPS capsule Take 550 mg by mouth daily       Prenatal Vit-Fe Fumarate-FA (PRENATAL ONE DAILY PO)        vitamin B complex with vitamin C (VITAMIN  B COMPLEX) TABS tablet Take 1 tablet by mouth daily         Allergies   Allergen Reactions     Cats      Dogs        Review of Systems:  -Skin Establ Pt: The patient denies any new rash, pruritus, or lesions that are symptomatic, changing or bleeding, except as per HPI.  -Constitutional: The patient is feeling generally well.    Physical exam:  Vitals: LMP 08/18/2018  GEN: This is a well developed, well-nourished female in no acute distress, in a pleasant mood.    SKIN: A focused examination of the scalp was performed. Significant for:   - 2 cm x 2 cm well circumscribed fluctuant minimally tender nodule on the left vertex scalp    - No other lesions of concern on areas examined.       Impression/Plan:  1. Cyst, inflamed on left vertex scalp  - Incision & Drainage: After discussion of benefits and risks including but not limited to bleeding/bruising, pain/swelling, infection, scar, incomplete removal, nerve damage/numbness, recurrence,  written consent and verbal consent was obtained. The area was cleaned with isopropyl alcohol. 0.5ml of 1% lidocaine with epinephrine was injected to obtain adequate anesthesia of the lesion on the left vertex scalp. The lesion was then lanced with a blade and drained of yellowish sebum. The patient tolerated the procedure and no complications were noted. The patient was provided with verbal and written post care instructions.  -Pt is currently pregnant and would like to avoid excision of the cyst until after pregnancy. She will notify the office if she would like a dermatology surgery referral.     Staff Involved:  Scribe/Staff    Scribe Disclosure:   Amira WOLFE, am serving as a scribe to document services personally performed by Radha Marshall PA-C, based on data collection and the provider's  statements to me.    Provider Disclosure:   The documentation recorded by the scribe accurately reflects the services I personally performed and the decisions made by me.    All risks, benefits and alternatives were discussed with patient.  Patient is in agreement and understands the assessment and plan.  All questions were answered.  Sun Screen Education was given.   Return to Clinic as needed.   Radha Marshall PA-C   [facility]

## 2018-11-14 NOTE — NURSING NOTE
Dermatology Rooming Note    Valerie Mansfield's goals for this visit include:   Chief Complaint   Patient presents with     Derm Problem     Cyst, Valerie notes she has a lesion on her scalp that is painful.      Lianne Xiong LPN

## 2018-12-14 ENCOUNTER — TELEPHONE (OUTPATIENT)
Dept: OBGYN | Facility: CLINIC | Age: 28
End: 2018-12-14

## 2018-12-14 NOTE — TELEPHONE ENCOUNTER
[facility] calling for prenatal records. MINDY received for this patient. Faxed records to provided fax #

## 2018-12-28 ENCOUNTER — PRE VISIT (OUTPATIENT)
Dept: MATERNAL FETAL MEDICINE | Facility: CLINIC | Age: 28
End: 2018-12-28

## 2018-12-31 ENCOUNTER — TELEPHONE (OUTPATIENT)
Dept: MATERNAL FETAL MEDICINE | Facility: CLINIC | Age: 28
End: 2018-12-31

## 2019-02-15 ENCOUNTER — HEALTH MAINTENANCE LETTER (OUTPATIENT)
Age: 29
End: 2019-02-15

## 2019-08-07 ENCOUNTER — OFFICE VISIT (OUTPATIENT)
Dept: DERMATOLOGY | Facility: CLINIC | Age: 29
End: 2019-08-07
Payer: COMMERCIAL

## 2019-08-07 DIAGNOSIS — L72.9 SCALP CYST: Primary | ICD-10-CM

## 2019-08-07 DIAGNOSIS — D22.9 MULTIPLE BENIGN NEVI: ICD-10-CM

## 2019-08-07 ASSESSMENT — PAIN SCALES - GENERAL: PAINLEVEL: NO PAIN (0)

## 2019-08-07 NOTE — PROGRESS NOTES
[facility] Dermatology Note      Dermatology Problem List:  1. Skin cancer screening, 05/14/18   2. Cyst, left vertex scalp  - s/p I&D 11/14/18     Encounter Date: Aug 7, 2019    CC:  Chief Complaint   Patient presents with     Derm Problem     Cyst on scalp.     History of Present Illness:  Ms. Valerie Mansfield is a 29 year old female who presents today for a cyst on her scalp. She was last seen in the dermatology clinic on 11/14/18 when she had a cyst on her left vertex scalp drained.    Today she reports that she would like to have the cyst on her scalp examined again. She said it weeps a little bit when she accidentally scratches at it. She has not gotten much sun this summer. She is interested in further drainage of the lesion. She notes that the lesion cycles in size.    Otherwise the patient reports no additional painful, bleeding, nonhealing or pruritic lesions and denies any new or changing moles.    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 The patient denies use of tanning beds.    Family History:  Not reviewed at this visit.     Medications:  Current Outpatient Medications   Medication Sig Dispense Refill     cholecalciferol (VITAMIN D3) 5000 units CAPS capsule Take 1 capsule (5,000 Units) by mouth daily Take one capsule daily. (Patient not taking: Reported on 11/8/2018) 90 capsule 3     Parul, Zingiber officinalis, (PARUL ROOT) 550 MG CAPS capsule Take 550 mg by mouth daily       Prenatal Vit-Fe Fumarate-FA (PRENATAL ONE DAILY PO)        vitamin B complex with vitamin C (VITAMIN  B COMPLEX) TABS tablet Take 1  tablet by mouth daily         Allergies   Allergen Reactions     Cats      Dogs        Review of Systems:  -Skin Establ Pt: The patient denies any new rash, pruritus, or lesions that are symptomatic, changing or bleeding, except as per HPI.  -Constitutional: The patient is feeling generally well.    Physical exam:  Vitals: LMP 08/18/2018   GEN: This is a well developed, well-nourished female in no acute distress, in a pleasant mood.    SKIN: A focused examination of the scalp and sun exposed areas was performed. Significant for:   - 2 cm x 1.5 cm well circumscribed fluctuant minimally tender nodule on the left vertex scalp with central eschar  -Scattered round, brown symmetric macules and papules to the upper trunk and upper extremities.  - No other lesions of concern on areas examined.       Impression/Plan:  1. Cyst, inflamed on left vertex scalp    She would like a referral to dermatology surgery for excision.     Photodocumentation obtained today    2. Multiple pigmented nevi back and upper extremities    Benign appearing. ABCDES noted.   Follow up with dermatology surgery, earlier for new or changing lesions.    Staff Involved:  Scribe/Staff    Scribe Disclosure:   I, Bam Esquivel, am serving as a scribe to document services personally performed by Radha Marshall PA-C, based on data collection and the provider's statements to me.    Provider Disclosure:   The documentation recorded by the scribe accurately reflects the services I personally performed and the decisions made by me.    All risks, benefits and alternatives were discussed with patient.  Patient is in agreement and understands the assessment and plan.  All questions were answered.  Sun Screen Education was given.   Return to Clinic with dermatology surgery for excision.   Radha Marshall PA-C   [facility] Dermatology [facility]

## 2019-08-07 NOTE — LETTER
8/7/2019       RE: Valerie Mansfield  [address]  [address]  [place] [address]     Dear Colleague,    Thank you for referring your patient, Valerie Mansfield, to the [facility] DERMATOLOGY at [facility]. Please see a copy of my visit note below.    [facility] Dermatology Note      Dermatology Problem List:  1. Skin cancer screening, 05/14/18   2. Cyst, left vertex scalp  - s/p I&D 11/14/18     Encounter Date: Aug 7, 2019    CC:  Chief Complaint   Patient presents with     Derm Problem     Cyst on scalp.     History of Present Illness:  Ms. Valerie Mansfield is a 29 year old female who presents today for a cyst on her scalp. She was last seen in the dermatology clinic on 11/14/18 when she had a cyst on her left vertex scalp drained.    Today she reports that she would like to have the cyst on her scalp examined again. She said it weeps a little bit when she accidentally scratches at it. She has not gotten much sun this summer. She is interested in further drainage of the lesion. She notes that the lesion cycles in size.    Otherwise the patient reports no additional painful, bleeding, nonhealing or pruritic lesions and denies any new or changing moles.    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 The patient denies use of tanning beds.    Family History:  Not reviewed at this visit.     Medications:  Current Outpatient Medications   Medication Sig Dispense Refill     cholecalciferol (VITAMIN D3) 5000 units CAPS capsule Take 1 capsule (5,000 Units) by mouth daily Take one capsule daily. (Patient  not taking: Reported on 11/8/2018) 90 capsule 3     Nika, Zingiber officinalis, (NIKA ROOT) 550 MG CAPS capsule Take 550 mg by mouth daily       Prenatal Vit-Fe Fumarate-FA (PRENATAL ONE DAILY PO)        vitamin B complex with vitamin C (VITAMIN  B COMPLEX) TABS tablet Take 1 tablet by mouth daily         Allergies   Allergen Reactions     Cats      Dogs        Review of Systems:  -Skin Establ Pt: The patient denies any new rash, pruritus, or lesions that are symptomatic, changing or bleeding, except as per HPI.  -Constitutional: The patient is feeling generally well.    Physical exam:  Vitals: LMP 08/18/2018   GEN: This is a well developed, well-nourished female in no acute distress, in a pleasant mood.    SKIN: A focused examination of the scalp and sun exposed areas was performed. Significant for:   - 2 cm x 1.5 cm well circumscribed fluctuant minimally tender nodule on the left vertex scalp with central eschar  -Scattered round, brown symmetric macules and papules to the upper trunk and upper extremities.  - No other lesions of concern on areas examined.       Impression/Plan:  1. Cyst, inflamed on left vertex scalp    She would like a referral to dermatology surgery for excision.     Photodocumentation obtained today    2. Multiple pigmented nevi back and upper extremities    Benign appearing. ABCDES noted.   Follow up with dermatology surgery, earlier for new or changing lesions.    Staff Involved:  Scribe/Staff    Scribe Disclosure:   AIDEN, Bam Esquivel, am serving as a scribe to document services personally performed by Radha Marshall PA-C, based on data collection and the provider's statements to me.    Provider Disclosure:   The documentation recorded by the scribe accurately reflects the services I personally performed and the decisions made by me.    All risks, benefits and alternatives were discussed with patient.  Patient is in agreement and understands the assessment and plan.  All questions were  answered.  Sun Screen Education was given.   Return to Clinic with dermatology surgery for excision.   Radha Marshall PA-C   [facility]

## 2019-08-07 NOTE — NURSING NOTE
Dermatology Rooming Note    Valerie Mansfield's goals for this visit include:   Chief Complaint   Patient presents with     Derm Problem     Cyst on scalp.     Lianne Xiong LPN

## 2019-08-19 ENCOUNTER — OFFICE VISIT (OUTPATIENT)
Dept: DERMATOLOGY | Facility: CLINIC | Age: 29
End: 2019-08-19
Payer: COMMERCIAL

## 2019-08-19 VITALS — SYSTOLIC BLOOD PRESSURE: 114 MMHG | HEART RATE: 88 BPM | DIASTOLIC BLOOD PRESSURE: 72 MMHG

## 2019-08-19 DIAGNOSIS — L72.9 SCALP CYST: Primary | ICD-10-CM

## 2019-08-19 NOTE — PROGRESS NOTES
DERMATOLOGIC EXCISION SURGERY PROCEDURE NOTE     Dermatology Surgery Clinic  [facility]  [address] [place] [address]    Date: August 19, 2019    Dermatology Problem List:  3. Cyst, left vertex scalp  - s/p I&D 11/14/18   - s/p Excision 08/19/19  2. Skin cancer screening, 05/14/18     NAME OF PROCEDURE: Excision with complex linear closure   Surgeon: Fantasma Brewer MD  Fellow: Leo Solis MD  PRE-OPERATIVE DIAGNOSIS:  Cyst  POST-OPERATIVE DIAGNOSIS: Same   FINAL EXCISION SIZE(EXCISION DEFECT SIZE): 2.0 x 1.3 cm, with 0 mm margin   FINAL REPAIR LENGTH: 3.4 cm   INDICATIONS: This patient presented with a 2.0 x 1.3 cm cyst of the L vertex scalp. Excision was indicated.   We discussed the principles of treatment and most likely complications including bleeding, infection, scarring, alteration in skin color and sensation, wound dehiscence,muscle weakness in the area, or recurrence of the lesion or disease. We reviewed that on occasion, after healing, a secondary procedure or revision may be recommended in order to obtain the best cosmetic or functional result.     PROCEDURE: The patient was taken to the operative suite. Time-out was performed. The treatment area was anesthetized with 1% lidocaine and epinephrine (1:100,000). The area was washed with Hibiclens, rinsed with saline and draped with sterile towels. The lesion was delineated and excised down to deep subcutaneous fat in an elliptical manner. Hemostasis was obtained by electrocoagulation.     REPAIR: A complex layered linear closure was selected as the procedure which would maximally preserve both function and cosmesis and for the following reasons: 1) the defect was widely undermined; 2) multiple deep plication and layered 4-0 Monocryl sutures placed; 3) wound size, depth, tension, and location.   After the excision of the tumor, the area was extensively and carefully undermined using blunt Metzenbaum  scissors. Hemostasis was obtained with spot electrocautery and ligation of vessels where necessary. An initial deep plication sutures of  sutures  were placed in the deep, subcutaneous and fascial planes to appose the lateral margins.  The subcutaneous and dermal layers were then closed with additional 4-0 Monocryl sutures. The epidermis was then carefully approximated along the length of the wound using 5-0 Fast absorbing gut sutures.   The final wound length was 3.4 cm. A total of 3.0 ml of anesthesia was administered for all surgical sites. Estimated blood loss was less than 10 ml for all surgical sites. A sterile pressure dressing was applied and wound care instructions, with a written handout, were given. The patient was discharged from the Dermatologic Surgery Center alert and ambulatory.    Follow up as needed.        Staff Involved:    Scribe Disclosure  I, Kate Gil, am serving as a scribe to document services personally performed by Dr. Fantasma Brewer, based on data collection and the provider's statements to me.       Attending attestation:  I was present for key elements of the procedure and immediately available for all other portions of the procedure.  I have reviewed the note and edited it as necessary.  Dr. Solis performed the procedure under my supervision.  Fantasma Brewer M.D.  Professor  Director of Dermatologic Surgery  Department of Dermatology  [facility]    Dermatology Surgery Clinic  [facility] [address] [address]

## 2019-08-19 NOTE — PATIENT INSTRUCTIONS

## 2019-08-19 NOTE — LETTER
8/19/2019       RE: Valerie Mansfield  [address]  [address]  [place] [address]     Dear Colleague,    Thank you for referring your patient, Valerie Mansfield, to the [facility] DERMATOLOGIC SURGERY at [facility]. Please see a copy of my visit note below.    DERMATOLOGIC EXCISION SURGERY PROCEDURE NOTE     Dermatology Surgery Clinic  [facility]  [address] [place] [id number]    Date: August 19, 2019    Dermatology Problem List:  3. Cyst, left vertex scalp  - s/p I&D 11/14/18   - s/p Excision 08/19/19  2. Skin cancer screening, 05/14/18     NAME OF PROCEDURE: Excision with complex linear closure   Surgeon: Fantasma Brewer MD  Fellow: Leo Solis MD  PRE-OPERATIVE DIAGNOSIS:  Cyst  POST-OPERATIVE DIAGNOSIS: Same   FINAL EXCISION SIZE(EXCISION DEFECT SIZE): 2.0 x 1.3 cm, with 0 mm margin   FINAL REPAIR LENGTH: 3.4 cm   INDICATIONS: This patient presented with a 2.0 x 1.3 cm cyst of the L vertex scalp. Excision was indicated.   We discussed the principles of treatment and most likely complications including bleeding, infection, scarring, alteration in skin color and sensation, wound dehiscence,muscle weakness in the area, or recurrence of the lesion or disease. We reviewed that on occasion, after healing, a secondary procedure or revision may be recommended in order to obtain the best cosmetic or functional result.     PROCEDURE: The patient was taken to the operative suite. Time-out was performed. The treatment area was anesthetized with 1% lidocaine and epinephrine (1:100,000). The area was washed with Hibiclens, rinsed with saline and draped with sterile towels. The lesion was delineated and excised down to deep subcutaneous fat in an elliptical manner. Hemostasis was obtained by electrocoagulation.     REPAIR: A complex layered linear closure was selected as the procedure which would maximally preserve both function and  cosmesis and for the following reasons: 1) the defect was widely undermined; 2) multiple deep plication and layered 4-0 Monocryl sutures placed; 3) wound size, depth, tension, and location.   After the excision of the tumor, the area was extensively and carefully undermined using blunt Metzenbaum scissors. Hemostasis was obtained with spot electrocautery and ligation of vessels where necessary. An initial deep plication sutures of  sutures  were placed in the deep, subcutaneous and fascial planes to appose the lateral margins.  The subcutaneous and dermal layers were then closed with additional 4-0 Monocryl sutures. The epidermis was then carefully approximated along the length of the wound using 5-0 Fast absorbing gut sutures.   The final wound length was 3.4 cm. A total of 3.0 ml of anesthesia was administered for all surgical sites. Estimated blood loss was less than 10 ml for all surgical sites. A sterile pressure dressing was applied and wound care instructions, with a written handout, were given. The patient was discharged from the Dermatologic Surgery Center alert and ambulatory.    Follow up as needed.        Staff Involved:    Scribe Disclosure  I, Kate Gil, am serving as a scribe to document services personally performed by Dr. Fantasma Brewer, based on data collection and the provider's statements to me.       Attending attestation:  I was present for key elements of the procedure and immediately available for all other portions of the procedure.  I have reviewed the note and edited it as necessary.  Dr. Solis performed the procedure under my supervision.  Fantasma Brewer M.D.  Professor  Director of Dermatologic Surgery  Department of Dermatology  [facility]    Dermatology Surgery Clinic  [facility] [place] [phone]

## 2019-08-21 LAB — COPATH REPORT: NORMAL

## 2020-03-02 ENCOUNTER — HEALTH MAINTENANCE LETTER (OUTPATIENT)
Age: 30
End: 2020-03-02

## 2020-12-20 ENCOUNTER — HEALTH MAINTENANCE LETTER (OUTPATIENT)
Age: 30
End: 2020-12-20

## 2021-04-24 ENCOUNTER — HEALTH MAINTENANCE LETTER (OUTPATIENT)
Age: 31
End: 2021-04-24

## 2021-08-19 ENCOUNTER — TRANSCRIBE ORDERS (OUTPATIENT)
Dept: OTHER | Age: 31
End: 2021-08-19

## 2021-08-19 DIAGNOSIS — E03.9 HYPOTHYROIDISM: Primary | ICD-10-CM

## 2021-10-03 ENCOUNTER — HEALTH MAINTENANCE LETTER (OUTPATIENT)
Age: 31
End: 2021-10-03

## 2022-05-15 ENCOUNTER — HEALTH MAINTENANCE LETTER (OUTPATIENT)
Age: 32
End: 2022-05-15

## 2022-06-01 ENCOUNTER — OFFICE VISIT (OUTPATIENT)
Dept: OPHTHALMOLOGY | Facility: CLINIC | Age: 32
End: 2022-06-01
Attending: OPHTHALMOLOGY
Payer: COMMERCIAL

## 2022-06-01 DIAGNOSIS — H53.9 VISUAL DISTURBANCE: Primary | ICD-10-CM

## 2022-06-01 DIAGNOSIS — H52.13 MYOPIA WITH ASTIGMATISM, BILATERAL: ICD-10-CM

## 2022-06-01 DIAGNOSIS — H52.203 MYOPIA WITH ASTIGMATISM, BILATERAL: ICD-10-CM

## 2022-06-01 PROCEDURE — G0463 HOSPITAL OUTPT CLINIC VISIT: HCPCS

## 2022-06-01 PROCEDURE — 92004 COMPRE OPH EXAM NEW PT 1/>: CPT | Mod: GC | Performed by: OPHTHALMOLOGY

## 2022-06-01 ASSESSMENT — TONOMETRY
OD_IOP_MMHG: 13
IOP_METHOD: TONOPEN
OS_IOP_MMHG: 15

## 2022-06-01 ASSESSMENT — REFRACTION_WEARINGRX
OS_CYLINDER: +0.25
OD_CYLINDER: +0.50
OD_AXIS: 018
OS_AXIS: 130
OD_SPHERE: -3.25
OS_SPHERE: -3.50

## 2022-06-01 ASSESSMENT — SLIT LAMP EXAM - LIDS
COMMENTS: NORMAL
COMMENTS: NORMAL

## 2022-06-01 ASSESSMENT — CONF VISUAL FIELD
OD_NORMAL: 1
METHOD: COUNTING FINGERS
OS_NORMAL: 1

## 2022-06-01 ASSESSMENT — VISUAL ACUITY
CORRECTION_TYPE: GLASSES
OS_CC+: -1
OS_CC: 20/20
METHOD: SNELLEN - LINEAR
OD_CC: 20/20

## 2022-06-01 ASSESSMENT — EXTERNAL EXAM - LEFT EYE: OS_EXAM: NORMAL

## 2022-06-01 ASSESSMENT — CUP TO DISC RATIO
OD_RATIO: 0.3
OS_RATIO: 0.3

## 2022-06-01 ASSESSMENT — EXTERNAL EXAM - RIGHT EYE: OD_EXAM: NORMAL

## 2022-06-01 NOTE — NURSING NOTE
Chief Complaints and History of Present Illnesses   Patient presents with     New Patient     Distored Vision     Chief Complaint(s) and History of Present Illness(es)     New Patient     Laterality: both eyes    Associated symptoms: photophobia.  Negative for flashes and floaters              Distored Vision     Associated symptoms: photophobia.  Negative for flashes and floaters    Treatments tried: no treatments    Pain scale: 0/10              Comments     New patient presents with intermittent distorted vision in her central vision.  She gave birth three months ago and noticed the distorted vision since then. She also has increased photophobia.   She had migraines during her third trimester of pregnancy.   Mayra Tran, COA, COA 10:17 AM 06/01/2022

## 2022-06-01 NOTE — PROGRESS NOTES
HPI     New Patient     In both eyes.  Associated symptoms include photophobia.  Negative for flashes and floaters.              Distored Vision     Associated symptoms include photophobia.  Negative for flashes and floaters.  Treatments tried include no treatments.  Pain was noted as 0/10.              Comments     New patient presents with intermittent distorted vision in her central vision.  She gave birth three months ago and noticed the distorted vision since then. She also has increased photophobia.   She had migraines during her third trimester of pregnancy.   ANTONETTE Isabel, COA 10:17 AM 06/01/2022            Last edited by Mayra Tran COA on 6/1/2022 10:17 AM. (History)         Review of systems for the eyes was negative other than the pertinent positives/negatives listed in the HPI.      Assessment & Plan    HPI:  Valerie Mansfield is a 32 year old female post-partum (3 months) presenting with intermittent, momentarily distorted vision ever since giving birth.  She also has increased photophobia.  Had migraines during 3rd trimester and wonders if these were associated in any way.  No pain.        POHx: Refractive error  PMHx: Gave birth 3 months ago, no other medical problems  Current Medications: cholecalciferol (VITAMIN D3) 5000 units CAPS capsule, Take 1 capsule (5,000 Units) by mouth daily Take one capsule daily.  Prenatal Vit-Fe Fumarate-FA (PRENATAL ONE DAILY PO),   Nika, Zingiber officinalis, (NIKA ROOT) 550 MG CAPS capsule, Take 550 mg by mouth daily  vitamin B complex with vitamin C (VITAMIN  B COMPLEX) TABS tablet, Take 1 tablet by mouth daily    No current facility-administered medications on file prior to visit.      Current Eye Medications: None      Assessment & Plan:    (H53.9) Visual disturbance  (primary encounter diagnosis)  Occurring since pregnancy.  Intermittent central distortion that occurs most often in bright light, only lasts a few seconds, feels like both eyes.   Unlikely to be peripartum refractive shift given VA 20/20 each eye in current glasses.  Normal DFE.  No signs of dry eye at present but this seems like the most likely diagnosis given symptom description.    - Trial AT's QID    (H52.13,  H52.203) Myopia with astigmatism, bilateral  VA 20/20 in old glasses, unlikely to be peripartum refraction change given good VA and intermittent nature of symptoms    Return in about 1 year (around 6/1/2023) for CEE.      Derek Garcia MD   PGY4    Attending Physician Attestation:  Complete documentation of historical and exam elements from today's encounter can be found in the full encounter summary report (not reduplicated in this progress note).  I personally obtained the chief complaint(s) and history of present illness.  I confirmed and edited as necessary the review of systems, past medical/surgical history, family history, social history, and examination findings as documented by others; and I examined the patient myself.  I personally reviewed the relevant tests, images, and reports as documented above.  I formulated and edited as necessary the assessment and plan and discussed the findings and management plan with the patient and family. - Raf Meadows MD

## 2022-06-06 ENCOUNTER — OFFICE VISIT (OUTPATIENT)
Dept: DERMATOLOGY | Facility: CLINIC | Age: 32
End: 2022-06-06
Payer: COMMERCIAL

## 2022-06-06 DIAGNOSIS — B07.0 PLANTAR WARTS: Primary | ICD-10-CM

## 2022-06-06 PROCEDURE — 99213 OFFICE O/P EST LOW 20 MIN: CPT | Performed by: PHYSICIAN ASSISTANT

## 2022-06-06 RX ORDER — CANDIDA ALBICANS 1000 [PNU]/ML
0.2 INJECTION, SOLUTION INTRADERMAL ONCE
Status: COMPLETED | OUTPATIENT
Start: 2022-06-06 | End: 2022-06-06

## 2022-06-06 RX ADMIN — CANDIDA ALBICANS 0.2 ML: 1000 INJECTION, SOLUTION INTRADERMAL at 11:18

## 2022-06-06 ASSESSMENT — PAIN SCALES - GENERAL: PAINLEVEL: NO PAIN (0)

## 2022-06-06 NOTE — NURSING NOTE
Dermatology Rooming Note    Valerie Mansfield's goals for this visit include:   Chief Complaint   Patient presents with     Derm Problem     Wart on the foot and had a cyst on the scalp removed and would like the site looked at      Venessa Kruse CMA on 6/6/2022 at 10:54 AM

## 2022-06-06 NOTE — PROGRESS NOTES
[facility] Dermatology Note  Encounter Date: Jun 6, 2022  Office Visit     Dermatology Problem List:  1. Skin cancer screening, 05/14/18   2. Cyst, left vertex scalp  - s/p I&D 11/14/18   - s/p excision 8/19/19  3. Warts, bilateral feet  - Intra-lesional candida injections 6/6/2022  ____________________________________________    Assessment & Plan:    # Verrucous plantaris x 4, bl feet  - Discussed the etiology of these lesions as well as their viral nature. Discussed treatment options and possible side effects such as cryotherapy with OTC medications or intra-lesional candida injections.  - Intra lesional candida injections 6/6/2022  - Previous tx: OTC topicals, salicylic acid, and pumice stone.  - Patient is breastfeeding at this time.    Procedures Performed:   - Intra-lesional candida injection procedure note. Discussion had with patient regarding candida antigen injection as potential treatment option and verbal consent obtained. After positioning and cleansing with isopropyl alcohol, 0.2 total mL of candida albicans antigen was injected into 1 lesion(s) on the great toe of the right foot. The patient tolerated the procedure well and left the dermatology clinic in good condition.    Follow-up: 6 week(s) in-person, or earlier for new or changing lesions    Staff and Scribe:     Scribe Disclosure:  AIDEN, Soco Yang, am serving as a scribe to document services personally performed by Radha Marshall PA-C based on data collection and the provider's statements to me.     Provider Disclosure:   The documentation recorded by the scribe accurately reflects the services I personally performed and the decisions made by me.    All risks, benefits and alternatives were discussed with patient.  Patient is in agreement and understands the assessment and plan.  All questions were answered.  Sun Screen Education was given.   Return to Clinic in 6 weeks or sooner as needed.   Radha Marshall  COSTA   [facility]   ____________________________________________    CC: Derm Problem (Wart on the foot and had a cyst on the scalp removed and would like the site looked at )    HPI:  Ms. Valerie Mansfield is a(n) 32 year old female who presents today as a return patient for a wart on her foot. The patient was last seen in dermatology on 8/19/19 by Dr. Brewer for an excision of a cyst on her left vertex scalp.    Today, the patient reports that she feels a divot below the excision site. She also notes two warts on each foot. She has tried pumice stoning, salicylic acid, and other topicals without relief.    Patient is otherwise feeling well, without additional skin concerns.    Patient recently had her 4 month old. She is working 10 hours a week.    Labs Reviewed:  N/A    Physical Exam:  Vitals: There were no vitals taken for this visit.  SKIN: Focused examination of the scalp and feet were performed.  - Verrucous papules on the left heel x 1, left great toe x 1, right central mid metatarsal x 1, and right great toe x 1.  - Well healed linear scar on the vertex scalp.  - No other lesions of concern on areas examined.     Medications:  Current Outpatient Medications   Medication     cholecalciferol (VITAMIN D3) 5000 units CAPS capsule     Prenatal Vit-Fe Fumarate-FA (PRENATAL ONE DAILY PO)     Nika, Zingiber officinalis, (NIKA ROOT) 550 MG CAPS capsule     vitamin B complex with vitamin C (VITAMIN  B COMPLEX) TABS tablet     No current facility-administered medications for this visit.      Past Medical History:   Patient Active Problem List   Diagnosis     Supervision of normal first pregnancy     Past Medical History:   Diagnosis Date     Menarche age 15     Migraines     resolved age 24 no meds      NO ACTIVE PROBLEMS         CC Referred Self, MD  No address on file on close of this encounter.

## 2022-06-06 NOTE — LETTER
Date:June 8, 2022      Patient was self referred, no letter generated. Do not send.        [facility] Health Information

## 2022-06-06 NOTE — PATIENT INSTRUCTIONS
WARTS  WHAT CAUSES WARTS?  Warts are a very common problem. It is estimated that 10% of children and young adults are infected.   These harmless skin growths can develop on any part of the body. On the hands, warts are most often raised. Flat warts commonly occur on the face, arms and legs. Lesions on the soles of the feet are often compressed or appear flat because of the pressure exerted on this site during walking.   Although warts are generally not a risk to one s overall health, they can be a nuisance. They may bleed if injured, interfere with walking, and cause pain or embarrassment. Since a virus causes warts, they may spread on the body or to other children. However, despite exposure, some people never get warts while others develop many. There is currently no reliable way to prevent warts, although avoidance of certain activities or behaviors such as not picking or shaving over them may prevent further spreading.   Warts frequently resolve spontaneously. The average common wart, if left untreated, will usually disappear within a 2 year time period. This spontaneous disappearance is less common in older child and adults.    TREATMENT OPTIONS:  There is no single perfect treatment for warts.   Because salicylic acid is the only FDA-approved treatment for non-genital warts, the most commonly used treatments are considered  off-label.  The ideal treatment depends on the number, location, size of warts, as well as your skin type and the judgment of your provider.   Treatment is not always indicated. Because the virus that causes warts frequently appear while existing ones are being treated, multiple office visits may be required.   Warts may return weeks or months after an apparent cure.   Unfortunately, no matter what treatments are used, some warts occasionally fail to resolve.   Treatments are generally targeted either at destroying the tissue where the wart resides ( destructive methods ), or stimulating  the body s immune system to recognize and eliminate the infection (immunotherapy ). Destruction can be achieved with chemicals like salicylic acid, freezing with liquid nitrogen, creams containing 5-fluorouracil (Efudex), or with laser surgery. Immunotherapies include imiquimod (Aldara), a cream that stimulates skin cells to produce virus fighting molecules, and injection of a purified form of yeast ( candida antigen) into the wart to alert the immune system to fight off the virus. With the latter treatment, repeated  booster  injections are typically administered every 4-6 weeks in clinic. In younger patients, the use of oral cimitidine (Tagament) is sometimes successful at stimulating the immune system to fight off warts.

## 2022-06-06 NOTE — LETTER
6/6/2022       RE: Valerie Mansfield  [address]  [address]  [facility] [place] [facility] [address]     Dear Colleague,    Thank you for referring your patient, Valerie Mansfield, to the [facility] DERMATOLOGY [facility]IN[facility] [place] at [facility]. Please see a copy of my visit note below.    [facility] Dermatology Note  Encounter Date: Jun 6, 2022  Office Visit     Dermatology Problem List:  1. Skin cancer screening, 05/14/18   2. Cyst, left vertex scalp  - s/p I&D 11/14/18   - s/p excision 8/19/19  3. Warts, bilateral feet  - Intra-lesional candida injections 6/6/2022  ____________________________________________    Assessment & Plan:    # Verrucous plantaris x 4, bl feet  - Discussed the etiology of these lesions as well as their viral nature. Discussed treatment options and possible side effects such as cryotherapy with OTC medications or intra-lesional candida injections.  - Intra lesional candida injections 6/6/2022  - Previous tx: OTC topicals, salicylic acid, and pumice stone.  - Patient is breastfeeding at this time.    Procedures Performed:   - Intra-lesional candida injection procedure note. Discussion had with patient regarding candida antigen injection as potential treatment option and verbal consent obtained. After positioning and cleansing with isopropyl alcohol, 0.2 total mL of candida albicans antigen was injected into 1 lesion(s) on the great toe of the right foot. The patient tolerated the procedure well and left the dermatology clinic in good condition.    Follow-up: 6 week(s) in-person, or earlier for new or changing lesions    Staff and Scribe:     Scribe Disclosure:  I, Soco Yang, am serving as a scribe to document services personally performed by Radha Marshall PA-C based on data collection and the provider's statements to me.     Provider Disclosure:   The documentation recorded by the scribe accurately reflects the services I  personally performed and the decisions made by me.    All risks, benefits and alternatives were discussed with patient.  Patient is in agreement and understands the assessment and plan.  All questions were answered.  Sun Screen Education was given.   Return to Clinic in 6 weeks or sooner as needed.   Radha Marshall PA-C   [facility]   ____________________________________________    CC: Derm Problem (Wart on the foot and had a cyst on the scalp removed and would like the site looked at )    HPI:  Ms. Valerie Mansfield is a(n) 32 year old female who presents today as a return patient for a wart on her foot. The patient was last seen in dermatology on 8/19/19 by Dr. Brewer for an excision of a cyst on her left vertex scalp.    Today, the patient reports that she feels a divot below the excision site. She also notes two warts on each foot. She has tried pumice stoning, salicylic acid, and other topicals without relief.    Patient is otherwise feeling well, without additional skin concerns.    Patient recently had her 4 month old. She is working 10 hours a week.    Labs Reviewed:  N/A    Physical Exam:  Vitals: There were no vitals taken for this visit.  SKIN: Focused examination of the scalp and feet were performed.  - Verrucous papules on the left heel x 1, left great toe x 1, right central mid metatarsal x 1, and right great toe x 1.  - Well healed linear scar on the vertex scalp.  - No other lesions of concern on areas examined.     Medications:  Current Outpatient Medications   Medication     cholecalciferol (VITAMIN D3) 5000 units CAPS capsule     Prenatal Vit-Fe Fumarate-FA (PRENATAL ONE DAILY PO)     Ginger, Zingiber officinalis, (GINGER ROOT) 550 MG CAPS capsule     vitamin B complex with vitamin C (VITAMIN  B COMPLEX) TABS tablet     No current facility-administered medications for this visit.      Past Medical History:   Patient Active Problem List   Diagnosis     Supervision of  normal first pregnancy     Past Medical History:   Diagnosis Date     Menarche age 15     Migraines     resolved age 24 no meds      NO ACTIVE PROBLEMS         CC Referred Self, MD  No address on file on close of this encounter.      Again, thank you for allowing me to participate in the care of your patient.      Sincerely,    Radha Marshall PA-C

## 2022-06-06 NOTE — NURSING NOTE
Drug Administration Record    Prior to injection, verified patient identity using patient's name and date of birth.  Due to injection administration, patient instructed to remain in clinic for 15 minutes  afterwards, and to report any adverse reaction to me immediately.    Drug Name: candida antigen  Dose: 0.2mL of candida antigen  Route administered: ID  NDC #: Candida (96953-441-06)  Amount of waste(mL):0.8mL  Reason for waste: Multi dose vial    LOT #:   SITE: see provider note  : [employer]   EXPIRATION DATE: 06/16/2024

## 2022-07-12 ENCOUNTER — OFFICE VISIT (OUTPATIENT)
Dept: DERMATOLOGY | Facility: CLINIC | Age: 32
End: 2022-07-12
Payer: COMMERCIAL

## 2022-07-12 DIAGNOSIS — B07.0 VERRUCA PLANTARIS: Primary | ICD-10-CM

## 2022-07-12 PROCEDURE — 11900 INJECT SKIN LESIONS </W 7: CPT | Performed by: DERMATOLOGY

## 2022-07-12 RX ORDER — CANDIDA ALBICANS 1000 [PNU]/ML
0.2 INJECTION, SOLUTION INTRADERMAL ONCE
Status: COMPLETED | OUTPATIENT
Start: 2022-07-12 | End: 2022-07-12

## 2022-07-12 RX ADMIN — CANDIDA ALBICANS 0.2 ML: 1000 INJECTION, SOLUTION INTRADERMAL at 12:30

## 2022-07-12 ASSESSMENT — PAIN SCALES - GENERAL: PAINLEVEL: NO PAIN (0)

## 2022-07-12 NOTE — LETTER
Date:July 25, 2022      Patient was self referred, no letter generated. Do not send.        [facility] Health Information

## 2022-07-12 NOTE — LETTER
7/12/2022       RE: Valerie Mansfield  [facility]  [address]  [place] [address]     Dear Colleague,    Thank you for referring your patient, Valerie Mansfield, to the [facility] DERMATOLOGY [facility] [place] at [facility]. Please see a copy of my visit note below.    [facility] Dermatology Note  Encounter Date: Jul 12, 2022  Office Visit     Dermatology Problem List:  1. Skin cancer screening, 05/14/18   2. Cyst, left vertex scalp  - s/p I&D 11/14/18   - s/p excision 8/19/19  3. Warts, bilateral feet  - Intra-lesional candida injections 6/6/2022, 7/12/22    ____________________________________________    Assessment & Plan:    # Verrucous plantaris x 4, bl feet  - Intra lesional candida injection today, see procedure note below  - Previous tx: OTC topicals, salicylic acid, and pumice stone.  - Patient is breastfeeding at this time.    Procedures Performed:   - Intra-lesional candida injection procedure note. Discussion had with patient regarding candida antigen injection as potential treatment option and verbal consent obtained. After positioning and cleansing with isopropyl alcohol, 0.2 total mL of candida albicans antigen was injected into 1 lesion(s) on the L plantar heel. The patient tolerated the procedure well and left the dermatology clinic in good condition.    Follow-up: As scheduled in 6 weeks    Staff and Scribe:     Scribe Disclosure:  ICarly, am serving as a scribe to document services personally performed by Jag Solo MD based on data collection and the provider's statements to me.     .ksc   ____________________________________________    CC: RECHECK (Plantar wart injection.  States she has not noticed a difference, looks the same.  )    HPI:  Ms. Valerie Mansfield is a(n) 32 year old female who presents today as a return patient for follow-up. Last seen by Radha Marshall PA-C on 6/6/22 at which point she  received candida injection for treatment of plantar warts on the bilateral feet.     Today, the patient reports that the plantar warts are about the same.   Patient is otherwise feeling well, without additional skin concerns.    Labs Reviewed:  N/A    Physical Exam:  Vitals: There were no vitals taken for this visit.  SKIN: Focused examination of bilateral feet was performed.  - There are verrucous papules with thrombosed capillaries interrupting dermatoglyphics on the bilateral feet x4  - No other lesions of concern on areas examined.     Medications:  Current Outpatient Medications   Medication     cholecalciferol (VITAMIN D3) 5000 units CAPS capsule     Prenatal Vit-Fe Fumarate-FA (PRENATAL ONE DAILY PO)     No current facility-administered medications for this visit.      Past Medical History:   Patient Active Problem List   Diagnosis     Supervision of normal first pregnancy     Past Medical History:   Diagnosis Date     Menarche age 15     Migraines     resolved age 24 no meds      NO ACTIVE PROBLEMS         CC Referred Self, MD  No address on file on close of this encounter.      Drug Administration Record    Prior to injection, verified patient identity using patient's name and date of birth.  Due to injection administration, patient instructed to remain in clinic for 15 minutes  afterwards, and to report any adverse reaction to me immediately.    Drug Name: candida antigen  Dose: 0.2mL of candida antigen  Route administered: ID  NDC #: Candida (40995-936-83)  Amount of waste(mL):0.8  Reason for waste: Single use vial    LOT #:   SITE: see chart  : [employer]  EXPIRATION DATE: 06/16/2024      Again, thank you for allowing me to participate in the care of your patient.      Sincerely,    Jag Solo MD

## 2022-07-12 NOTE — PROGRESS NOTES
[facility] Dermatology Note  Encounter Date: Jul 12, 2022  Office Visit     Dermatology Problem List:  1. Skin cancer screening, 05/14/18   2. Cyst, left vertex scalp  - s/p I&D 11/14/18   - s/p excision 8/19/19  3. Warts, bilateral feet  - Intra-lesional candida injections 6/6/2022, 7/12/22    ____________________________________________    Assessment & Plan:    # Verrucous plantaris x 4, bl feet  - Intra lesional candida injection today, see procedure note below  - Previous tx: OTC topicals, salicylic acid, and pumice stone.  - Patient is breastfeeding at this time.    Procedures Performed:   - Intra-lesional candida injection procedure note. Discussion had with patient regarding candida antigen injection as potential treatment option and verbal consent obtained. After positioning and cleansing with isopropyl alcohol, 0.2 total mL of candida albicans antigen was injected into 1 lesion(s) on the L plantar heel. The patient tolerated the procedure well and left the dermatology clinic in good condition.    Follow-up: As scheduled in 6 weeks    Staff and Scribe:     Scribe Disclosure:  I, Carly Castillo, am serving as a scribe to document services personally performed by Jag Solo MD based on data collection and the provider's statements to me.     Staff attestation:  The documentation recorded by the scribe accurately reflects the services I personally performed and the decisions I personally made. I have made edits where needed.    Jag Solo MD  Staff Dermatologist and Dermatopathologist  , Department of Dermatology   ____________________________________________    CC: RECHECK (Plantar wart injection.  States she has not noticed a difference, looks the same.  )    HPI:  Ms. Valerie Mansfield is a(n) 32 year old female who presents today as a return patient for follow-up. Last seen by Radha Marshall PA-C on 6/6/22 at which point she received candida injection for  treatment of plantar warts on the bilateral feet.     Today, the patient reports that the plantar warts are about the same.   Patient is otherwise feeling well, without additional skin concerns.    Labs Reviewed:  N/A    Physical Exam:  Vitals: There were no vitals taken for this visit.  SKIN: Focused examination of bilateral feet was performed.  - There are verrucous papules with thrombosed capillaries interrupting dermatoglyphics on the bilateral feet x4  - No other lesions of concern on areas examined.     Medications:  Current Outpatient Medications   Medication     cholecalciferol (VITAMIN D3) 5000 units CAPS capsule     Prenatal Vit-Fe Fumarate-FA (PRENATAL ONE DAILY PO)     No current facility-administered medications for this visit.      Past Medical History:   Patient Active Problem List   Diagnosis     Supervision of normal first pregnancy     Past Medical History:   Diagnosis Date     Menarche age 15     Migraines     resolved age 24 no meds      NO ACTIVE PROBLEMS         CC Referred Self, MD  No address on file on close of this encounter.

## 2022-07-12 NOTE — NURSING NOTE
Dermatology Rooming Note    Valerie Mansfield's goals for this visit include:   Chief Complaint   Patient presents with     RECHECK     Plantar wart injection.  States she has not noticed a difference, looks the same.       Yahaira Lomas, CMA

## 2022-07-13 NOTE — PROGRESS NOTES
Drug Administration Record    Prior to injection, verified patient identity using patient's name and date of birth.  Due to injection administration, patient instructed to remain in clinic for 15 minutes  afterwards, and to report any adverse reaction to me immediately.    Drug Name: candida antigen  Dose: 0.2mL of candida antigen  Route administered: ID  NDC #: Candida ([id number])  Amount of waste(mL):0.8  Reason for waste: Single use vial    LOT #:   SITE: see chart  : [employer]  EXPIRATION DATE: 06/16/2024

## 2022-09-01 ENCOUNTER — OFFICE VISIT (OUTPATIENT)
Dept: DERMATOLOGY | Facility: CLINIC | Age: 32
End: 2022-09-01
Payer: COMMERCIAL

## 2022-09-01 DIAGNOSIS — B07.0 VERRUCA PLANTARIS: Primary | ICD-10-CM

## 2022-09-01 PROCEDURE — 11900 INJECT SKIN LESIONS </W 7: CPT | Mod: GC | Performed by: DERMATOLOGY

## 2022-09-01 PROCEDURE — 99213 OFFICE O/P EST LOW 20 MIN: CPT | Mod: 25 | Performed by: DERMATOLOGY

## 2022-09-01 RX ORDER — CANDIDA ALBICANS 1000 [PNU]/ML
0.2 INJECTION, SOLUTION INTRADERMAL ONCE
Status: ACTIVE | OUTPATIENT
Start: 2022-09-01

## 2022-09-01 ASSESSMENT — PAIN SCALES - GENERAL: PAINLEVEL: NO PAIN (0)

## 2022-09-01 NOTE — NURSING NOTE
Dermatology Rooming Note    Valerie Mansfield's goals for this visit include:   Chief Complaint   Patient presents with     Derm Problem     Valerie is here today for a wart follow up      BRADLEY Cervantes

## 2022-09-01 NOTE — PROGRESS NOTES
Drug Administration Record    Prior to injection, verified patient identity using patient's name and date of birth.  Due to injection administration, patient instructed to remain in clinic for 15 minutes  afterwards, and to report any adverse reaction to me immediately.    Drug Name: candida antigen  Dose: 0.2mL of candida antigen  Route administered: ID  NDC #: Candida ([id number])  Amount of waste(mL):0.8  Reason for waste: Single use vial    LOT #:   SITE: foot  : [employer]  EXPIRATION DATE: 09/20/2024

## 2022-09-01 NOTE — PROGRESS NOTES
[facility] Dermatology Note  Encounter Date: Sep 1, 2022  Office Visit     Dermatology Problem List:  1. Skin cancer screening, 05/14/18   2. Cyst, left vertex scalp  - s/p I&D 11/14/18   - s/p excision 8/19/19  3. Warts, bilateral feet  - Intra-lesional candida injections 6/6/2022, 7/12/22, 9/1/22    ____________________________________________    Assessment & Plan:    # Verruca plantaris x 4, bl feet  Offered LN2 to day but declined, she will consider in the future.  - Intra lesional candida injection today, see procedure note below  - Previous tx: OTC topicals, salicylic acid, and pumice stone.  - Patient is breastfeeding at this time. Discussed possibility of topical Efudex in the future once not breastfeeding, if persistent/uncontrolled.    Procedures Performed:   - Intra-lesional candida injection procedure note. Discussion had with patient regarding candida antigen injection as potential treatment option and verbal consent obtained. After positioning and cleansing with isopropyl alcohol, 0.2 total mL of candida albicans antigen was injected into 1 lesion(s) on the L plantar heel. The patient tolerated the procedure well and left the dermatology clinic in good condition.    Follow-up in 1-3 months, sooner PRN. Can cancel if warts resolve.    Staff and Resident:    Yeesnia Schroeder MD  Dermatology Resident PGY3    I have seen and examined this patient and agree with the assessment and plan as documented in the resident's note, and was present for all procedures.    Juan Pang MD  Dermatology Attending    ____________________________________________    CC: Derm Problem (Valerie is here today for a wart follow up )    HPI:  Ms. Valerie Mansfield is a(n) 32 year old female who presents today as a return patient for follow-up. Last seen roughly 1m ago at which time she had candida injections for bilateral warts. Here for third injeciton. Warts have maybe shrunk a little. Still breastfeeding and  will until her newest is age 2 (he is currently 6 months.)   Patient is otherwise feeling well, without additional skin concerns.    Labs Reviewed:  N/A    Physical Exam:  Vitals: There were no vitals taken for this visit.  SKIN: Focused examination of bilateral feet was performed.  - There are verrucous papules with thrombosed capillaries interrupting dermatoglyphics on the bilateral feet x4  - No other lesions of concern on areas examined.     Medications:  Current Outpatient Medications   Medication     cholecalciferol (VITAMIN D3) 5000 units CAPS capsule     Prenatal Vit-Fe Fumarate-FA (PRENATAL ONE DAILY PO)     No current facility-administered medications for this visit.      Past Medical History:   Patient Active Problem List   Diagnosis     Supervision of normal first pregnancy     Past Medical History:   Diagnosis Date     Menarche age 15     Migraines     resolved age 24 no meds      NO ACTIVE PROBLEMS         CC Referred Self, MD  No address on file on close of this encounter.

## 2022-09-01 NOTE — LETTER
9/1/2022       RE: Valerie Mansfield  [address]  [address]  [facility] [address]     Dear Colleague,    Thank you for referring your patient, Valerie Mansfield, to the [facility] DERMATOLOGY CLINIC [place] at [facility]. Please see a copy of my visit note below.    [facility] Dermatology Note  Encounter Date: Sep 1, 2022  Office Visit     Dermatology Problem List:  1. Skin cancer screening, 05/14/18   2. Cyst, left vertex scalp  - s/p I&D 11/14/18   - s/p excision 8/19/19  3. Warts, bilateral feet  - Intra-lesional candida injections 6/6/2022, 7/12/22, 9/1/22    ____________________________________________    Assessment & Plan:    # Verruca plantaris x 4, bl feet  Offered LN2 to day but declined, she will consider in the future.  - Intra lesional candida injection today, see procedure note below  - Previous tx: OTC topicals, salicylic acid, and pumice stone.  - Patient is breastfeeding at this time. Discussed possibility of topical Efudex in the future once not breastfeeding, if persistent/uncontrolled.    Procedures Performed:   - Intra-lesional candida injection procedure note. Discussion had with patient regarding candida antigen injection as potential treatment option and verbal consent obtained. After positioning and cleansing with isopropyl alcohol, 0.2 total mL of candida albicans antigen was injected into 1 lesion(s) on the L plantar heel. The patient tolerated the procedure well and left the dermatology clinic in good condition.    Follow-up in 1-3 months, sooner PRN. Can cancel if warts resolve.    Staff and Resident:    Yesenia Schroeder MD  Dermatology Resident PGY3    I have seen and examined this patient and agree with the assessment and plan as documented in the resident's note, and was present for all procedures.    Juan Pang MD  Dermatology Attending    ____________________________________________    CC: Derm Problem  (Valerie is here today for a wart follow up )    HPI:  Ms. Valerie Mansfield is a(n) 32 year old female who presents today as a return patient for follow-up. Last seen roughly 1m ago at which time she had candida injections for bilateral warts. Here for third injeciton. Warts have maybe shrunk a little. Still breastfeeding and will until her newest is age 2 (he is currently 6 months.)   Patient is otherwise feeling well, without additional skin concerns.    Labs Reviewed:  N/A    Physical Exam:  Vitals: There were no vitals taken for this visit.  SKIN: Focused examination of bilateral feet was performed.  - There are verrucous papules with thrombosed capillaries interrupting dermatoglyphics on the bilateral feet x4  - No other lesions of concern on areas examined.     Medications:  Current Outpatient Medications   Medication     cholecalciferol (VITAMIN D3) 5000 units CAPS capsule     Prenatal Vit-Fe Fumarate-FA (PRENATAL ONE DAILY PO)     No current facility-administered medications for this visit.      Past Medical History:   Patient Active Problem List   Diagnosis     Supervision of normal first pregnancy     Past Medical History:   Diagnosis Date     Menarche age 15     Migraines     resolved age 24 no meds      NO ACTIVE PROBLEMS         CC Referred Self, MD  No address on file on close of this encounter.      Drug Administration Record    Prior to injection, verified patient identity using patient's name and date of birth.  Due to injection administration, patient instructed to remain in clinic for 15 minutes  afterwards, and to report any adverse reaction to me immediately.    Drug Name: candida antigen  Dose: 0.2mL of candida antigen  Route administered: ID  NDC #: Candida (56022-818-50)  Amount of waste(mL):0.8  Reason for waste: Single use vial    LOT #:   SITE: foot  : [employer]  EXPIRATION DATE: 09/20/2024      Again, thank you for allowing me to participate in the care of your  patient.      Sincerely,    Juan Pang MD

## 2022-09-01 NOTE — LETTER
Date:September 30, 2022      Patient was self referred, no letter generated. Do not send.        [facility] Health Information

## 2022-09-10 ENCOUNTER — HEALTH MAINTENANCE LETTER (OUTPATIENT)
Age: 32
End: 2022-09-10

## 2022-09-29 PROBLEM — B07.0 VERRUCA PLANTARIS: Status: ACTIVE | Noted: 2022-09-29

## 2023-06-03 ENCOUNTER — HEALTH MAINTENANCE LETTER (OUTPATIENT)
Age: 33
End: 2023-06-03

## 2023-06-22 ENCOUNTER — OFFICE VISIT (OUTPATIENT)
Dept: DERMATOLOGY | Facility: CLINIC | Age: 33
End: 2023-06-22
Payer: COMMERCIAL

## 2023-06-22 DIAGNOSIS — B07.0 PLANTAR WART: Primary | ICD-10-CM

## 2023-06-22 PROCEDURE — 17110 DESTRUCTION B9 LES UP TO 14: CPT | Performed by: DERMATOLOGY

## 2023-06-22 RX ORDER — LEVOTHYROXINE SODIUM 25 UG/1
TABLET ORAL
COMMUNITY

## 2023-06-22 ASSESSMENT — PAIN SCALES - GENERAL: PAINLEVEL: NO PAIN (0)

## 2023-06-22 NOTE — NURSING NOTE
Dermatology Rooming Note    Valerie Mansfield's goals for this visit include:   Chief Complaint   Patient presents with     Derm Problem     Patient reports warts on bilateral feet. The patient reports no improvement with yeast injections.      Lynn Paniagua LPN

## 2023-06-22 NOTE — PROGRESS NOTES
[facility] Dermatology Note    Encounter Date: Jun 22, 2023    Dermatology Problem List:  1. Cyst, left vertex scalp  - s/p I&D 11/14/18   - s/p excision 8/19/19  2. Plantar warts  - Intra-lesional Candida antigen injections 6/6/2022, 7/12/22, 9/1/22  - cryotherapy 6/22/23  - salicylic acid  - in reserve: 5-fluorouracil 5% cream (when done breastfeeding)    ______________________________________    Impression/Plan:  1. Plantar warts: x7 on the feet. We discussed risks/benefits of next steps in treatment including cryotherapy, salicylic acid, Candida antigen injections, imiquimod, 5-fluorouracil 5% cream. Will start with cryotherapy and salicylic acid today.  - Cryotherapy procedure note: After verbal consent and discussion of risks and benefits including, but not limited to, dyspigmentation/scar, blister, and pain, 7 was(were) pared with a #15 blade and treated with 1-2 mm freeze border for 1-2 cycles with liquid nitrogen. Post cryotherapy instructions were provided.       Follow-up in 3 months.       Staff Involved:  Staff Only    Mahad Adams MD   of Dermatology  Department of Dermatology  [facility]      CC:   Chief Complaint   Patient presents with     Derm Problem     Patient reports warts on bilateral feet. The patient reports no improvement with yeast injections.        History of Present Illness:  Ms. Valerie Mansfield is a 33 year old female who presents as a new patient.    Plantar warts - had Candida injection last visit  - prior cryotherapy - home and in-office  - not painful but have been persistent      Labs:  N/A    Physical exam:  Vitals: There were no vitals taken for this visit.  GEN: This is a well developed, well-nourished female in no acute distress, in a pleasant mood.    SKIN: Bell phototype II  - Focused examination of the feet was performed.  - 7 verrucous papules on the feet  - No other lesions of concern on  areas examined.     Past Medical History:   Past Medical History:   Diagnosis Date     Menarche age 15     Migraines     resolved age 24 no meds      NO ACTIVE PROBLEMS      Past Surgical History:   Procedure Laterality Date     ENT SURGERY  2010    [place] teeth removal        Social History:   reports that she has never smoked. She has never used smokeless tobacco. She reports current alcohol use. She reports that she does not use drugs.    Family History:  Family History   Problem Relation Age of Onset     Skin Cancer Paternal Grandfather        Medications:  Current Outpatient Medications   Medication Sig Dispense Refill     levothyroxine (SYNTHROID/LEVOTHROID) 25 MCG tablet levothyroxine 25 mcg tablet   Take 1.5 tablet(s) every day by oral route in the morning for 30 days.       Prenatal Vit-Fe Fumarate-FA (PRENATAL ONE DAILY PO)        cholecalciferol (VITAMIN D3) 5000 units CAPS capsule Take 1 capsule (5,000 Units) by mouth daily Take one capsule daily. (Patient not taking: Reported on 6/22/2023) 90 capsule 3     Allergies   Allergen Reactions     Cats      Dogs

## 2023-06-22 NOTE — LETTER
6/22/2023       RE: Valerie Mansfield  [facility]  [address]  [facility] [address]     Dear Colleague,    Thank you for referring your patient, Valerie Mansfield, to the [facility] DERMATOLOGY CLINIC [place] at [facility]. Please see a copy of my visit note below.    [facility] Dermatology Note    Encounter Date: Jun 22, 2023    Dermatology Problem List:  1. Cyst, left vertex scalp  - s/p I&D 11/14/18   - s/p excision 8/19/19  2. Plantar warts  - Intra-lesional Candida antigen injections 6/6/2022, 7/12/22, 9/1/22  - cryotherapy 6/22/23  - salicylic acid  - in reserve: 5-fluorouracil 5% cream (when done breastfeeding)    ______________________________________    Impression/Plan:  1. Plantar warts: x7 on the feet. We discussed risks/benefits of next steps in treatment including cryotherapy, salicylic acid, Candida antigen injections, imiquimod, 5-fluorouracil 5% cream. Will start with cryotherapy and salicylic acid today.  - Cryotherapy procedure note: After verbal consent and discussion of risks and benefits including, but not limited to, dyspigmentation/scar, blister, and pain, 7 was(were) pared with a #15 blade and treated with 1-2 mm freeze border for 1-2 cycles with liquid nitrogen. Post cryotherapy instructions were provided.       Follow-up in 3 months.       Staff Involved:  Staff Only    Mahad Adams MD   of Dermatology  Department of Dermatology  [facility]      CC:   Chief Complaint   Patient presents with    Derm Problem     Patient reports warts on bilateral feet. The patient reports no improvement with yeast injections.        History of Present Illness:  Ms. Valerie Mansfield is a 33 year old female who presents as a new patient.    Plantar warts - had Candida injection last visit  - prior cryotherapy - home and in-office  - not painful but have been  persistent      Labs:  N/A    Physical exam:  Vitals: There were no vitals taken for this visit.  GEN: This is a well developed, well-nourished female in no acute distress, in a pleasant mood.    SKIN: Bell phototype II  - Focused examination of the feet was performed.  - 7 verrucous papules on the feet  - No other lesions of concern on areas examined.     Past Medical History:   Past Medical History:   Diagnosis Date    Menarche age 15    Migraines     resolved age 24 no meds     NO ACTIVE PROBLEMS      Past Surgical History:   Procedure Laterality Date    ENT SURGERY  2010    [place] teeth removal        Social History:   reports that she has never smoked. She has never used smokeless tobacco. She reports current alcohol use. She reports that she does not use drugs.    Family History:  Family History   Problem Relation Age of Onset    Skin Cancer Paternal Grandfather        Medications:  Current Outpatient Medications   Medication Sig Dispense Refill    levothyroxine (SYNTHROID/LEVOTHROID) 25 MCG tablet levothyroxine 25 mcg tablet   Take 1.5 tablet(s) every day by oral route in the morning for 30 days.      Prenatal Vit-Fe Fumarate-FA (PRENATAL ONE DAILY PO)       cholecalciferol (VITAMIN D3) 5000 units CAPS capsule Take 1 capsule (5,000 Units) by mouth daily Take one capsule daily. (Patient not taking: Reported on 6/22/2023) 90 capsule 3     Allergies   Allergen Reactions    Cats     Dogs

## 2023-06-30 ENCOUNTER — TELEPHONE (OUTPATIENT)
Dept: DERMATOLOGY | Facility: CLINIC | Age: 33
End: 2023-06-30

## 2023-06-30 NOTE — TELEPHONE ENCOUNTER
No Answer/ Busy. Called patient to  schedule the following:    Appointment type: Return  Provider: Dr. Adams  Return date: September 2023  Specialty phone number: [phone]

## 2024-03-05 ENCOUNTER — OFFICE VISIT (OUTPATIENT)
Dept: DERMATOLOGY | Facility: CLINIC | Age: 34
End: 2024-03-05
Payer: COMMERCIAL

## 2024-03-05 DIAGNOSIS — L72.11 PILAR CYST: Primary | ICD-10-CM

## 2024-03-05 PROCEDURE — 99212 OFFICE O/P EST SF 10 MIN: CPT | Performed by: PHYSICIAN ASSISTANT

## 2024-03-05 ASSESSMENT — PAIN SCALES - GENERAL: PAINLEVEL: NO PAIN (0)

## 2024-03-05 NOTE — PROGRESS NOTES
[facility] Dermatology Note  Encounter Date: Mar 5, 2024  Office Visit     Reviewed patients past medical history and pertinent chart review prior to patients visit today.     Dermatology Problem List:  1. Cyst, left vertex scalp  - s/p I&D 11/14/18   - s/p excision 8/19/19  2. Plantar warts  - Intra-lesional Candida antigen injections 6/6/2022, 7/12/22, 9/1/22  - cryotherapy 6/22/23  - salicylic acid  - in reserve: 5-fluorouracil 5% cream (when done breastfeeding)    ____________________________________________    Assessment & Plan:     # Pilar cyst  - We reviewed the etiology of the lesion. We reviewed the option to treat with punch biopsy. The patient preferred to continue to monitor the lesion. I recommend follow up with any concerning changes.     All risks, benefits and alternatives were discussed with patient.  Patient is in agreement and understands the assessment and plan.  All questions were answered.  So Haynes PA-C  [facility] Dermatology  _______________________________________    CC: Skin Check (Valerie is here for a spot on your head. )    HPI:  Ms. Valerie Mansfield is a(n) 33 year old female who presents today as a return patient for a lesion on the scalp. The patient underwent excision of a pilar cyst of the scalp in 2019. Recently, the patient has noticed a new lesion in the scar. The lesion was tender at one point, but is not currently symptomatic. Patient is otherwise feeling well, without additional skin concerns.      Physical Exam:  SKIN: Focused examination of vertex scalp was performed.  - The vertex scalp scalp, the posterior aspect of previous scar, demonstrates a 0.6 x 0.6 well demarcated, mobile subcutaneous nodule, consistent with a pilar cyst.       - No other lesions of concern on areas examined.     Medications:  Current Outpatient Medications   Medication    levothyroxine (SYNTHROID/LEVOTHROID) 25 MCG tablet    Prenatal Vit-Fe Fumarate-FA (PRENATAL ONE  DAILY PO)    salicylic acid (MEDIPLAST) 40 % miscellaneous    cholecalciferol (VITAMIN D3) 5000 units CAPS capsule     Current Facility-Administered Medications   Medication    candida albicans skin test injection 0.2 mL      Past Medical History:   Patient Active Problem List   Diagnosis    Supervision of normal first pregnancy    Verruca plantaris     Past Medical History:   Diagnosis Date    Menarche age 15    Migraines     resolved age 24 no meds     NO ACTIVE PROBLEMS        CC Referred Self, MD  No address on file on close of this encounter.

## 2024-03-05 NOTE — LETTER
3/5/2024       RE: Valerie Mansfield  [address]  [address]  [place] [address]     Dear Colleague,    Thank you for referring your patient, Valerie Mansfield, to the [facility] DERMATOLOGY CLINIC [place] at [facility]. Please see a copy of my visit note below.    [facility] Dermatology Note  Encounter Date: Mar 5, 2024  Office Visit     Reviewed patients past medical history and pertinent chart review prior to patients visit today.     Dermatology Problem List:  1. Cyst, left vertex scalp  - s/p I&D 11/14/18   - s/p excision 8/19/19  2. Plantar warts  - Intra-lesional Candida antigen injections 6/6/2022, 7/12/22, 9/1/22  - cryotherapy 6/22/23  - salicylic acid  - in reserve: 5-fluorouracil 5% cream (when done breastfeeding)    ____________________________________________    Assessment & Plan:     # Pilar cyst  - We reviewed the etiology of the lesion. We reviewed the option to treat with punch biopsy. The patient preferred to continue to monitor the lesion. I recommend follow up with any concerning changes.     All risks, benefits and alternatives were discussed with patient.  Patient is in agreement and understands the assessment and plan.  All questions were answered.  So Haynes PA-C  [facility] Dermatology  _______________________________________    CC: Skin Check (Valerie is here for a spot on your head. )    HPI:  Ms. Valerie Mansfield is a(n) 33 year old female who presents today as a return patient for a lesion on the scalp. The patient underwent excision of a pilar cyst of the scalp in 2019. Recently, the patient has noticed a new lesion in the scar. The lesion was tender at one point, but is not currently symptomatic. Patient is otherwise feeling well, without additional skin concerns.      Physical Exam:  SKIN: Focused examination of vertex scalp was performed.  - The vertex scalp scalp, the posterior aspect of  previous scar, demonstrates a 0.6 x 0.6 well demarcated, mobile subcutaneous nodule, consistent with a pilar cyst.       - No other lesions of concern on areas examined.     Medications:  Current Outpatient Medications   Medication    levothyroxine (SYNTHROID/LEVOTHROID) 25 MCG tablet    Prenatal Vit-Fe Fumarate-FA (PRENATAL ONE DAILY PO)    salicylic acid (MEDIPLAST) 40 % miscellaneous    cholecalciferol (VITAMIN D3) 5000 units CAPS capsule     Current Facility-Administered Medications   Medication    candida albicans skin test injection 0.2 mL      Past Medical History:   Patient Active Problem List   Diagnosis    Supervision of normal first pregnancy    Verruca plantaris     Past Medical History:   Diagnosis Date    Menarche age 15    Migraines     resolved age 24 no meds     NO ACTIVE PROBLEMS        CC Referred Self, MD  No address on file on close of this encounter.

## 2024-03-05 NOTE — NURSING NOTE
Dermatology Rooming Note    Valerie Mansfield's goals for this visit include:   Chief Complaint   Patient presents with    Skin Check     Valerie is here for a spot on your head.      Ever Muniz, EMT

## 2024-07-07 ENCOUNTER — HEALTH MAINTENANCE LETTER (OUTPATIENT)
Age: 34
End: 2024-07-07

## 2025-07-13 ENCOUNTER — HEALTH MAINTENANCE LETTER (OUTPATIENT)
Age: 35
End: 2025-07-13

## (undated) RX ORDER — CANDIDA ALBICANS 1000 [PNU]/ML
INJECTION, SOLUTION INTRADERMAL
Status: DISPENSED
Start: 2022-07-12

## (undated) RX ORDER — CANDIDA ALBICANS 1000 [PNU]/ML
INJECTION, SOLUTION INTRADERMAL
Status: DISPENSED
Start: 2022-06-06

## (undated) RX ORDER — CANDIDA ALBICANS 1000 [PNU]/ML
INJECTION, SOLUTION INTRADERMAL
Status: DISPENSED
Start: 2022-09-01